# Patient Record
Sex: FEMALE | Race: WHITE | Employment: FULL TIME | URBAN - METROPOLITAN AREA
[De-identification: names, ages, dates, MRNs, and addresses within clinical notes are randomized per-mention and may not be internally consistent; named-entity substitution may affect disease eponyms.]

---

## 2017-04-12 ENCOUNTER — OFFICE VISIT (OUTPATIENT)
Dept: FAMILY MEDICINE CLINIC | Facility: CLINIC | Age: 27
End: 2017-04-12

## 2017-04-12 VITALS
DIASTOLIC BLOOD PRESSURE: 60 MMHG | BODY MASS INDEX: 20.2 KG/M2 | TEMPERATURE: 99 F | SYSTOLIC BLOOD PRESSURE: 100 MMHG | HEIGHT: 63 IN | RESPIRATION RATE: 16 BRPM | OXYGEN SATURATION: 98 % | WEIGHT: 114 LBS | HEART RATE: 80 BPM

## 2017-04-12 DIAGNOSIS — N30.00 ACUTE CYSTITIS WITHOUT HEMATURIA: Primary | ICD-10-CM

## 2017-04-12 DIAGNOSIS — R30.0 BURNING WITH URINATION: ICD-10-CM

## 2017-04-12 PROCEDURE — 81003 URINALYSIS AUTO W/O SCOPE: CPT | Performed by: FAMILY MEDICINE

## 2017-04-12 PROCEDURE — 99203 OFFICE O/P NEW LOW 30 MIN: CPT | Performed by: FAMILY MEDICINE

## 2017-04-12 RX ORDER — SULFAMETHOXAZOLE AND TRIMETHOPRIM 800; 160 MG/1; MG/1
1 TABLET ORAL 2 TIMES DAILY
Qty: 14 TABLET | Refills: 0 | Status: SHIPPED | OUTPATIENT
Start: 2017-04-12 | End: 2017-04-19

## 2017-04-12 NOTE — PROGRESS NOTES
Annabel Ross is a 27year old female. HPI:   Patient presents with symptoms of UTI. Complaining of urinary frequency, urgency, dysuria x 3 days. Denies back pain, fever, hematuria. No significant past history of UTI's.   Had one recently a few we every 4 (four) hours as needed for Nausea. Disp: 10 tablet Rfl: 0   [DISCONTINUED] Biotin 1000 MCG Oral Tab Take 1,000 mcg by mouth daily.  Disp: 30 tablet Rfl: 5   [DISCONTINUED] Fluticasone Propionate 50 MCG/ACT Nasal Suspension 2 sprays by Each Nare rout

## 2017-04-13 ENCOUNTER — TELEPHONE (OUTPATIENT)
Dept: FAMILY MEDICINE CLINIC | Facility: CLINIC | Age: 27
End: 2017-04-13

## 2017-04-14 NOTE — TELEPHONE ENCOUNTER
Spoke to patient in response to a page. States she was seen yesterday for a UTI and had 3 doses of antibiotic(Sulfa/TMP) and she feels worse. Having abdominal pain and urinary frequency. No fever, no back pain, no N/V. Ibuprofen provides no relief.  She has

## 2017-04-19 ENCOUNTER — TELEPHONE (OUTPATIENT)
Dept: FAMILY MEDICINE CLINIC | Facility: CLINIC | Age: 27
End: 2017-04-19

## 2017-04-19 DIAGNOSIS — N30.00 ACUTE CYSTITIS WITHOUT HEMATURIA: Primary | ICD-10-CM

## 2017-04-19 RX ORDER — SULFAMETHOXAZOLE AND TRIMETHOPRIM 800; 160 MG/1; MG/1
1 TABLET ORAL 2 TIMES DAILY
Qty: 14 TABLET | Refills: 0 | Status: SHIPPED | OUTPATIENT
Start: 2017-04-19 | End: 2017-04-21

## 2017-04-19 NOTE — TELEPHONE ENCOUNTER
Extended bactrim for one more week as urine cx was sensitive to bactrim. Follow up if no improvement.

## 2017-04-19 NOTE — TELEPHONE ENCOUNTER
Pt transferred to nurse. She states she was seen last week for a uti. States she was given abx, feels it helped some but now feels her sx's are returning like before. Pt had been on bactrim DS 1 tab bid x 7 days. Urine cx was sensitive to this.  When [employer]

## 2017-04-21 ENCOUNTER — TELEPHONE (OUTPATIENT)
Dept: FAMILY MEDICINE CLINIC | Facility: CLINIC | Age: 27
End: 2017-04-21

## 2017-04-21 DIAGNOSIS — N30.00 ACUTE CYSTITIS WITHOUT HEMATURIA: Primary | ICD-10-CM

## 2017-04-21 RX ORDER — SULFAMETHOXAZOLE AND TRIMETHOPRIM 800; 160 MG/1; MG/1
1 TABLET ORAL 2 TIMES DAILY
Qty: 14 TABLET | Refills: 0 | Status: SHIPPED | OUTPATIENT
Start: 2017-04-21 | End: 2017-04-21

## 2017-04-21 RX ORDER — SULFAMETHOXAZOLE AND TRIMETHOPRIM 800; 160 MG/1; MG/1
1 TABLET ORAL 2 TIMES DAILY
Qty: 14 TABLET | Refills: 0 | Status: SHIPPED | OUTPATIENT
Start: 2017-04-21 | End: 2017-04-28

## 2017-04-21 NOTE — TELEPHONE ENCOUNTER
pt looking for script but looks like it was faxed but primary pharmacy was wrong for some reason it was [facility] but she uses [facility] that however pt is going out of town needs script sent to below pharmacy so she can  after work she is erinn

## 2017-04-21 NOTE — TELEPHONE ENCOUNTER
Called to the [facility] talked with Ac Fernandez who stated that they did not fill the medication ordered because the pharmacy did not have insurance information for the pt. New script was sent to [facility].   Pt called and advised

## 2017-04-28 ENCOUNTER — HOSPITAL ENCOUNTER (OUTPATIENT)
Dept: CT IMAGING | Facility: HOSPITAL | Age: 27
Discharge: HOME | End: 2017-04-28
Attending: FAMILY MEDICINE
Payer: COMMERCIAL

## 2017-04-28 ENCOUNTER — TELEPHONE (OUTPATIENT)
Dept: FAMILY MEDICINE CLINIC | Facility: CLINIC | Age: 27
End: 2017-04-28

## 2017-04-28 ENCOUNTER — APPOINTMENT (OUTPATIENT)
Dept: LAB | Facility: HOSPITAL | Age: 27
End: 2017-04-28
Attending: FAMILY MEDICINE
Payer: COMMERCIAL

## 2017-04-28 ENCOUNTER — OFFICE VISIT (OUTPATIENT)
Dept: FAMILY MEDICINE CLINIC | Facility: CLINIC | Age: 27
End: 2017-04-28

## 2017-04-28 VITALS
WEIGHT: 114 LBS | RESPIRATION RATE: 16 BRPM | HEIGHT: 63 IN | TEMPERATURE: 98 F | BODY MASS INDEX: 20.2 KG/M2 | DIASTOLIC BLOOD PRESSURE: 60 MMHG | SYSTOLIC BLOOD PRESSURE: 100 MMHG | HEART RATE: 80 BPM

## 2017-04-28 DIAGNOSIS — K92.1 BLOOD IN STOOL: ICD-10-CM

## 2017-04-28 DIAGNOSIS — R10.32 LLQ PAIN: Primary | ICD-10-CM

## 2017-04-28 DIAGNOSIS — R19.7 ACUTE DIARRHEA: ICD-10-CM

## 2017-04-28 DIAGNOSIS — R10.32 LLQ PAIN: ICD-10-CM

## 2017-04-28 PROCEDURE — 74177 CT ABD & PELVIS W/CONTRAST: CPT

## 2017-04-28 PROCEDURE — 84702 CHORIONIC GONADOTROPIN TEST: CPT | Performed by: FAMILY MEDICINE

## 2017-04-28 PROCEDURE — 81025 URINE PREGNANCY TEST: CPT | Performed by: FAMILY MEDICINE

## 2017-04-28 PROCEDURE — 99214 OFFICE O/P EST MOD 30 MIN: CPT | Performed by: FAMILY MEDICINE

## 2017-04-28 NOTE — TELEPHONE ENCOUNTER
Pt transferred to nurse. She states she is having some concerning sx's that she is wondering if related to her being on abx long term? Reports since yesterday having LLQ pain, vomited x 2, and 3 bouts of diarrhea.  All 3 times she has noticed red blood in

## 2017-04-28 NOTE — TELEPHONE ENCOUNTER
S/w pt. Advised she should be seen. She is not able to leave work. Would need later hours. Asked about schedule tomorrow. I stated one  here and she is full. Advised urgent care for her sx's as they are there later.  She did not seem willing to go but con

## 2017-04-28 NOTE — TELEPHONE ENCOUNTER
Received call from pt requesting that the order for the pregnancy test be faxed to [phone]. Advised pt that fax would be sent. Faxed order.

## 2017-04-28 NOTE — TELEPHONE ENCOUNTER
I was paged with patient CT scan results. There was a left ovarian cyst.  Results discussed with patient over the phone. Recommended to use Tylenol or ibuprofen for pain over the weekend. Try probiotic for diarrhea monitor stools for blood.   If it persi

## 2017-04-28 NOTE — TELEPHONE ENCOUNTER
Pt transferred to nurse. She was just here for vs with Dr Fernanda Tatum and is wondering if Dr Fernanda Tatum would consider her doing a blood test to r/o pregnancy. Her urine Hcg test was negative in ofc.  She is concerned if she proceeds with the CT (STAT to r/o diverticul

## 2017-04-29 ENCOUNTER — TELEPHONE (OUTPATIENT)
Dept: FAMILY MEDICINE CLINIC | Facility: CLINIC | Age: 27
End: 2017-04-29

## 2017-04-29 NOTE — TELEPHONE ENCOUNTER
At dr caban's request, call to pt for condition update. Pt sts after she spoke with dr Lockhart"discussed going to ER with my . i probably did everything wrong.  He had some Hydrocodone 5/325 left from when he had a kidney stone, so i took half a tabl

## 2017-04-29 NOTE — TELEPHONE ENCOUNTER
Patient has paged me tonight. She has severe pain in the left lower quadrant in the same area. There is no diarrhea right now. She did not take any medication for her pain. Advised her to take ibuprofen 200 mg 2-3 tablets once now.   If the pain would n

## 2017-05-01 NOTE — PATIENT INSTRUCTIONS
Unknown Causes of Abdominal Pain (Female)    The exact cause of your abdominal (stomach) pain is not clear. This does not mean that this is something to worry about.  Everyone likes to know the exact cause of the problem, but sometimes with abdominal pain · Water is important so you do not get dehydrated. Soup may also be good. Sports drinks may also help, especially if they are not too acidic. Make sure you don't drink sugary drinks as this can make things worse. Take liquids in small amounts.  Do not guzzl © 3940-1432 [address], [address] [employer]. All rights reserved. This information is not intended as a substitute for professional medical care. Always follow your healthcare professional's instructions.

## 2017-05-01 NOTE — PROGRESS NOTES
[facility] Family Medicine Office Note  Chief Complaint:   Patient presents with:  Abdominal Pain: LLQ PAIN      HPI:   This is a 27year old female coming in for abdominal pain. [place] [facility] Pain is described as aching, sharp.  Severi REVIEW OF SYSTEMS:   ROS:  CONSTITUTIONAL:  Denies any unusual weight gain/loss, fever, chills, weakness or fatigue. CARDIOVASCULAR:  Denies chest pain, chest pressure or chest discomfort. No palpitations or edema.   Denies any dyspnea on exertion or a refer to GI  -  Could be due to hemorrhoids or viral gastroenteritis  - CT ABDOMEN+PELVIS(CONTRAST ONLY)(CPT=74177); Future    3.  Acute diarrhea  -  R/o viral GE vs. Diverticulitis  -  CT showed no diverticulitis  -  Likely viral GE but if persists, will c

## 2017-07-05 PROCEDURE — 84144 ASSAY OF PROGESTERONE: CPT | Performed by: OBSTETRICS & GYNECOLOGY

## 2017-07-07 ENCOUNTER — LAB ENCOUNTER (OUTPATIENT)
Dept: LAB | Age: 27
End: 2017-07-07
Attending: OBSTETRICS & GYNECOLOGY
Payer: COMMERCIAL

## 2017-07-07 DIAGNOSIS — O20.0 THREATENED ABORTION, ANTEPARTUM: ICD-10-CM

## 2017-07-07 LAB — HCG QUANTITATIVE: 107 MIU/ML (ref ?–1)

## 2017-07-07 PROCEDURE — 84702 CHORIONIC GONADOTROPIN TEST: CPT

## 2017-07-07 PROCEDURE — 36415 COLL VENOUS BLD VENIPUNCTURE: CPT

## 2017-07-10 NOTE — PROGRESS NOTES
Normal rise in HCG. Will see pt at around 7 weeks gestation for return OB visit.   Please call patient and let her know of normal results

## 2017-07-18 ENCOUNTER — HOSPITAL ENCOUNTER (EMERGENCY)
Facility: HOSPITAL | Age: 27
Discharge: HOME | End: 2017-07-19
Payer: COMMERCIAL

## 2017-07-18 ENCOUNTER — APPOINTMENT (OUTPATIENT)
Dept: MRI IMAGING | Facility: HOSPITAL | Age: 27
End: 2017-07-18
Payer: COMMERCIAL

## 2017-07-18 DIAGNOSIS — G43.009 MIGRAINE WITHOUT AURA AND WITHOUT STATUS MIGRAINOSUS, NOT INTRACTABLE: Primary | ICD-10-CM

## 2017-07-18 DIAGNOSIS — R41.0 TRANSIENT CONFUSION: ICD-10-CM

## 2017-07-18 LAB
ALBUMIN SERPL-MCNC: 3.5 G/DL (ref 3.5–4.8)
ALP LIVER SERPL-CCNC: 55 U/L (ref 37–98)
ALT SERPL-CCNC: 20 U/L (ref 14–54)
AST SERPL-CCNC: 13 U/L (ref 15–41)
BASOPHILS # BLD AUTO: 0.06 X10(3) UL (ref 0–0.1)
BASOPHILS NFR BLD AUTO: 0.8 %
BILIRUB SERPL-MCNC: 0.4 MG/DL (ref 0.1–2)
BUN BLD-MCNC: 11 MG/DL (ref 8–20)
CALCIUM BLD-MCNC: 8.2 MG/DL (ref 8.3–10.3)
CHLORIDE: 109 MMOL/L (ref 101–111)
CO2: 23 MMOL/L (ref 22–32)
CREAT BLD-MCNC: 0.64 MG/DL (ref 0.55–1.02)
EOSINOPHIL # BLD AUTO: 0.26 X10(3) UL (ref 0–0.3)
EOSINOPHIL NFR BLD AUTO: 3.3 %
ERYTHROCYTE [DISTWIDTH] IN BLOOD BY AUTOMATED COUNT: 11.9 % (ref 11.5–16)
GLUCOSE BLD-MCNC: 107 MG/DL (ref 70–99)
GLUCOSE BLD-MCNC: 123 MG/DL (ref 65–99)
HCT VFR BLD AUTO: 32.5 % (ref 34–50)
HGB BLD-MCNC: 11.5 G/DL (ref 12–16)
IMMATURE GRANULOCYTE COUNT: 0.02 X10(3) UL (ref 0–1)
IMMATURE GRANULOCYTE RATIO %: 0.3 %
LYMPHOCYTES # BLD AUTO: 2.29 X10(3) UL (ref 0.9–4)
LYMPHOCYTES NFR BLD AUTO: 29.2 %
M PROTEIN MFR SERPL ELPH: 6.8 G/DL (ref 6.1–8.3)
MCH RBC QN AUTO: 29.5 PG (ref 27–33.2)
MCHC RBC AUTO-ENTMCNC: 35.4 G/DL (ref 31–37)
MCV RBC AUTO: 83.3 FL (ref 81–100)
MONOCYTES # BLD AUTO: 0.48 X10(3) UL (ref 0.1–0.6)
MONOCYTES NFR BLD AUTO: 6.1 %
NEUTROPHIL ABS PRELIM: 4.74 X10 (3) UL (ref 1.3–6.7)
NEUTROPHILS # BLD AUTO: 4.74 X10(3) UL (ref 1.3–6.7)
NEUTROPHILS NFR BLD AUTO: 60.3 %
PLATELET # BLD AUTO: 191 10(3)UL (ref 150–450)
POCT LOT NUMBER: NORMAL
POCT URINE PREGNANCY: POSITIVE
POTASSIUM SERPL-SCNC: 3.6 MMOL/L (ref 3.6–5.1)
RBC # BLD AUTO: 3.9 X10(6)UL (ref 3.8–5.1)
RED CELL DISTRIBUTION WIDTH-SD: 36 FL (ref 35.1–46.3)
SODIUM SERPL-SCNC: 139 MMOL/L (ref 136–144)
WBC # BLD AUTO: 7.9 X10(3) UL (ref 4–13)

## 2017-07-18 PROCEDURE — 99283 EMERGENCY DEPT VISIT LOW MDM: CPT

## 2017-07-18 PROCEDURE — 82962 GLUCOSE BLOOD TEST: CPT

## 2017-07-18 PROCEDURE — 80053 COMPREHEN METABOLIC PANEL: CPT

## 2017-07-18 PROCEDURE — 81025 URINE PREGNANCY TEST: CPT

## 2017-07-18 PROCEDURE — 70551 MRI BRAIN STEM W/O DYE: CPT

## 2017-07-18 PROCEDURE — 36415 COLL VENOUS BLD VENIPUNCTURE: CPT

## 2017-07-18 PROCEDURE — 85025 COMPLETE CBC W/AUTO DIFF WBC: CPT

## 2017-07-18 RX ORDER — METOCLOPRAMIDE HYDROCHLORIDE 5 MG/ML
5 INJECTION INTRAMUSCULAR; INTRAVENOUS ONCE
Status: DISCONTINUED | OUTPATIENT
Start: 2017-07-18 | End: 2017-07-18

## 2017-07-18 RX ORDER — SODIUM CHLORIDE 9 MG/ML
1000 INJECTION, SOLUTION INTRAVENOUS ONCE
Status: DISCONTINUED | OUTPATIENT
Start: 2017-07-18 | End: 2017-07-18

## 2017-07-18 RX ORDER — ONDANSETRON 2 MG/ML
4 INJECTION INTRAMUSCULAR; INTRAVENOUS ONCE
Status: DISCONTINUED | OUTPATIENT
Start: 2017-07-18 | End: 2017-07-18

## 2017-07-18 RX ORDER — DIPHENHYDRAMINE HYDROCHLORIDE 50 MG/ML
25 INJECTION INTRAMUSCULAR; INTRAVENOUS ONCE
Status: DISCONTINUED | OUTPATIENT
Start: 2017-07-18 | End: 2017-07-18

## 2017-07-19 VITALS
BODY MASS INDEX: 19.12 KG/M2 | OXYGEN SATURATION: 98 % | WEIGHT: 112 LBS | RESPIRATION RATE: 15 BRPM | DIASTOLIC BLOOD PRESSURE: 52 MMHG | HEART RATE: 81 BPM | HEIGHT: 64 IN | TEMPERATURE: 99 F | SYSTOLIC BLOOD PRESSURE: 98 MMHG

## 2017-07-19 NOTE — ED NOTES
Pt refused all medication because she stated that she is pregnant and her pain isn't severe. Pt was assured that the medication is safe and can be given during pregnancy. Pt still refused medication. Dr. Charleen Daugherty was made aware.  Pt was advised to verbalize

## 2017-07-19 NOTE — ED PROVIDER NOTES
Patient Seen in: [facility] Emergency Department    History   Patient presents with:  Dizziness (neurologic)  Altered Mental Status (neurologic)  Pregnancy Issues (gynecologic)    Stated Complaint: dizzy-trouble with words-confusion    HPI    Patien Allergies Sister    • Schizophrenia Sister    • High Cholesterol Mother    • Gastro-Intestinal Disorder Mother      cholecystectomy   • Thyroid Disorder Mother      small goiter   • Hypertension Mother    • Cancer Maternal Grandmother      Lung   • Heart D Extremities: Warm, well perfused, without edema    No significant deformity or joint abnormality    Calves are symmetric and nontender  Good peripheral color, cap refill . Skin: Unremarkable without lesions or rash.      Neurologic: Awake alert an subsequently canceled by myself.       MRI brain      IMPRESSION:  -Limited areas of increased FLAIR signal in the subarachnoid space within a few cortical sulci, predominately on the left (eg. ax FLAIR im 12-14) -- no appreciable GRE susceptibility changes that should prompt the patient's immediate return to the emergency department. Reasonable over the counter and prescription treatment options and Physician follow up plan was discussed. The patient is discharged home in good condition.            Dispo

## 2017-07-19 NOTE — ED NOTES
Discharge instructions were reviewed and pt verbalized understanding. Pt is AxOx4, ambulatory with steady gait, and is going home with her mother. Pt states she feels better at this time.

## 2017-07-19 NOTE — ED INITIAL ASSESSMENT (HPI)
Pt states she had migraine since 1730 tonight. She states she had episodes of confusion between 1730 to 1830. She states her confusion has subsided since then. Pt reports she is 6 weeks pregnant.

## 2017-07-20 ENCOUNTER — TELEPHONE (OUTPATIENT)
Dept: FAMILY MEDICINE CLINIC | Facility: CLINIC | Age: 27
End: 2017-07-20

## 2017-07-20 ENCOUNTER — OFFICE VISIT (OUTPATIENT)
Dept: NEUROLOGY | Facility: CLINIC | Age: 27
End: 2017-07-20

## 2017-07-20 VITALS
SYSTOLIC BLOOD PRESSURE: 100 MMHG | HEIGHT: 65 IN | RESPIRATION RATE: 16 BRPM | BODY MASS INDEX: 19.49 KG/M2 | WEIGHT: 117 LBS | HEART RATE: 69 BPM | DIASTOLIC BLOOD PRESSURE: 60 MMHG | TEMPERATURE: 99 F

## 2017-07-20 DIAGNOSIS — R29.818 TRANSIENT NEUROLOGICAL SYMPTOMS: ICD-10-CM

## 2017-07-20 DIAGNOSIS — G43.109 COMPLICATED MIGRAINE: Primary | ICD-10-CM

## 2017-07-20 PROCEDURE — 99213 OFFICE O/P EST LOW 20 MIN: CPT | Performed by: PHYSICIAN ASSISTANT

## 2017-07-20 RX ORDER — ONDANSETRON 4 MG/1
4 TABLET, ORALLY DISINTEGRATING ORAL EVERY 8 HOURS PRN
Qty: 30 TABLET | Refills: 0 | Status: SHIPPED | OUTPATIENT
Start: 2017-07-20 | End: 2017-08-07

## 2017-07-20 RX ORDER — ACETAMINOPHEN AND CODEINE PHOSPHATE 300; 30 MG/1; MG/1
1 TABLET ORAL EVERY 4 HOURS PRN
Qty: 30 TABLET | Refills: 0 | Status: SHIPPED | OUTPATIENT
Start: 2017-07-20 | End: 2017-08-07

## 2017-07-20 NOTE — TELEPHONE ENCOUNTER
Pt informed of the recommendations and voiced understanding. Pt states she is at work, not worse but still the same. States she will continue to monitor and will go to ER if worsen.

## 2017-07-20 NOTE — PROGRESS NOTES
HPI:    Patient ID: Moreno Grimm is a 32year old female. HPI     Patient is a 32year old female here for follow-up from [facility] ER for migraine. She is currently  6 weeks pregnant.   Patient with hx of migraines that would typically occur every vomiting and went to the ER. Review of Systems   Constitutional: Positive for fatigue. Negative for fever. HENT: Negative for facial swelling. Eyes: Positive for photophobia and visual disturbance. Respiratory: Negative for shortness of breath. side and 2+ on the left side. Brachioradialis reflexes are 2+ on the right side and 2+ on the left side. Patellar reflexes are 2+ on the right side and 2+ on the left side.        Achilles reflexes are 2+ on the right side and 2+ on the left emily

## 2017-07-20 NOTE — PATIENT INSTRUCTIONS
Refill policies:    • Allow 2-3 business days for refills; controlled substances may take longer.   • Contact your pharmacy at least 5 days prior to running out of medication and have them send an electronic request or submit request through the [facility] have a procedure or additional testing performed. [facility]) will contact your insurance carrier to obtain pre-certification or prior authorization.     Unfortunately, JANNA has seen an increase in denial of payment even though the p

## 2017-07-20 NOTE — TELEPHONE ENCOUNTER
Still with Migraines, confusion and dizziness this morning. Last night had upper back and neck pain and weakness. Concern about the MRI result. Please advise.

## 2017-07-20 NOTE — TELEPHONE ENCOUNTER
pt went to ER for migraines also pregnant they did MRI and as she was reading said something about Meningitis pt called OB due to confusion and dizzy ness they advised to go to ER for possible stock pt went and was given meds that were ok for also being pr

## 2017-07-20 NOTE — TELEPHONE ENCOUNTER
The findings of her MRI of the brain were very nonspecific. It could be related to migraine or some form of inflammatory process but nothing could be excluded which is why it states meningitis as well.   She is having worsening symptoms and persistent symp

## 2017-08-07 PROBLEM — Z34.80 ENCOUNTER FOR SUPERVISION OF OTHER NORMAL PREGNANCY: Status: ACTIVE | Noted: 2017-08-07

## 2017-08-07 LAB — AMB EXT STREP B CULTURE: POSITIVE

## 2017-08-07 PROCEDURE — 86901 BLOOD TYPING SEROLOGIC RH(D): CPT | Performed by: OBSTETRICS & GYNECOLOGY

## 2017-08-07 PROCEDURE — 86762 RUBELLA ANTIBODY: CPT | Performed by: OBSTETRICS & GYNECOLOGY

## 2017-08-07 PROCEDURE — 87491 CHLMYD TRACH DNA AMP PROBE: CPT | Performed by: OBSTETRICS & GYNECOLOGY

## 2017-08-07 PROCEDURE — 86900 BLOOD TYPING SEROLOGIC ABO: CPT | Performed by: OBSTETRICS & GYNECOLOGY

## 2017-08-07 PROCEDURE — 87340 HEPATITIS B SURFACE AG IA: CPT | Performed by: OBSTETRICS & GYNECOLOGY

## 2017-08-07 PROCEDURE — 36415 COLL VENOUS BLD VENIPUNCTURE: CPT | Performed by: OBSTETRICS & GYNECOLOGY

## 2017-08-07 PROCEDURE — 87086 URINE CULTURE/COLONY COUNT: CPT | Performed by: OBSTETRICS & GYNECOLOGY

## 2017-08-07 PROCEDURE — 86850 RBC ANTIBODY SCREEN: CPT | Performed by: OBSTETRICS & GYNECOLOGY

## 2017-08-07 PROCEDURE — 87147 CULTURE TYPE IMMUNOLOGIC: CPT | Performed by: OBSTETRICS & GYNECOLOGY

## 2017-08-07 PROCEDURE — 87591 N.GONORRHOEAE DNA AMP PROB: CPT | Performed by: OBSTETRICS & GYNECOLOGY

## 2017-08-07 PROCEDURE — 87389 HIV-1 AG W/HIV-1&-2 AB AG IA: CPT | Performed by: OBSTETRICS & GYNECOLOGY

## 2017-08-07 PROCEDURE — 86780 TREPONEMA PALLIDUM: CPT | Performed by: OBSTETRICS & GYNECOLOGY

## 2017-08-07 PROCEDURE — 85025 COMPLETE CBC W/AUTO DIFF WBC: CPT | Performed by: OBSTETRICS & GYNECOLOGY

## 2017-08-09 PROBLEM — B95.1: Status: ACTIVE | Noted: 2017-08-09

## 2017-08-09 PROBLEM — O23.41: Status: ACTIVE | Noted: 2017-08-09

## 2017-08-09 PROBLEM — B95.1 GROUP B STREPTOCOCCUS URINARY TRACT INFECTION AFFECTING PREGNANCY IN FIRST TRIMESTER, ANTEPARTUM: Status: ACTIVE | Noted: 2017-08-09

## 2017-08-09 PROBLEM — O23.41 GROUP B STREPTOCOCCUS URINARY TRACT INFECTION AFFECTING PREGNANCY IN FIRST TRIMESTER, ANTEPARTUM: Status: ACTIVE | Noted: 2017-08-09

## 2017-08-20 PROBLEM — O26.899 RH NEGATIVE STATE IN ANTEPARTUM PERIOD: Status: ACTIVE | Noted: 2017-08-20

## 2017-08-20 PROBLEM — Z67.91 RH NEGATIVE STATE IN ANTEPARTUM PERIOD (HCC): Status: ACTIVE | Noted: 2017-08-20

## 2017-08-20 PROBLEM — O26.899 RH NEGATIVE STATE IN ANTEPARTUM PERIOD (HCC): Status: ACTIVE | Noted: 2017-08-20

## 2017-08-20 PROBLEM — Z67.91 RH NEGATIVE STATE IN ANTEPARTUM PERIOD: Status: ACTIVE | Noted: 2017-08-20

## 2017-09-06 PROBLEM — Z83.49 FAMILY HISTORY OF CYSTIC FIBROSIS: Status: ACTIVE | Noted: 2017-09-06

## 2017-10-06 ENCOUNTER — TELEPHONE (OUTPATIENT)
Dept: FAMILY MEDICINE CLINIC | Facility: CLINIC | Age: 27
End: 2017-10-06

## 2017-10-06 NOTE — TELEPHONE ENCOUNTER
Pt called. She is 17 weeks pregnant. She has had a cough and sinus congestion for 1 week. She has been feeling light headed and feels like her heart us racing. She called her OB/GYN and they advised her to call her PCP.  Pt has no fever and does not feel sh

## 2017-11-13 ENCOUNTER — HOSPITAL ENCOUNTER (OUTPATIENT)
Dept: ULTRASOUND IMAGING | Facility: HOSPITAL | Age: 27
Discharge: HOME | End: 2017-11-13
Attending: OBSTETRICS & GYNECOLOGY
Payer: COMMERCIAL

## 2017-11-13 DIAGNOSIS — Z36.89 ENCOUNTER FOR FETAL ANATOMIC SURVEY: ICD-10-CM

## 2017-11-13 PROCEDURE — 76805 OB US >/= 14 WKS SNGL FETUS: CPT | Performed by: OBSTETRICS & GYNECOLOGY

## 2017-12-20 ENCOUNTER — LAB ENCOUNTER (OUTPATIENT)
Dept: LAB | Age: 27
End: 2017-12-20
Attending: OBSTETRICS & GYNECOLOGY
Payer: COMMERCIAL

## 2017-12-20 DIAGNOSIS — Z36.9 ENCOUNTER FOR ANTENATAL SCREENING OF MOTHER: ICD-10-CM

## 2017-12-20 PROCEDURE — 85018 HEMOGLOBIN: CPT

## 2017-12-20 PROCEDURE — 87389 HIV-1 AG W/HIV-1&-2 AB AG IA: CPT

## 2017-12-20 PROCEDURE — 86850 RBC ANTIBODY SCREEN: CPT

## 2017-12-20 PROCEDURE — 85014 HEMATOCRIT: CPT

## 2017-12-20 PROCEDURE — 36415 COLL VENOUS BLD VENIPUNCTURE: CPT

## 2017-12-20 PROCEDURE — 86780 TREPONEMA PALLIDUM: CPT

## 2017-12-22 NOTE — PROGRESS NOTES
Blood type is O neg. Pt called to remind her to schedule Rhogam inj. Mailbox full. Unable to leave msg.

## 2017-12-28 NOTE — PROGRESS NOTES
Pt notified of results and reminded on need for rhogam injection. Pt reminded of rhogam administration policy, rhogam needing to be administered 3-5 days after antibody screen. Pt verb understanding.  Will have antibody screen redrawn this week along with 1

## 2017-12-29 ENCOUNTER — LAB ENCOUNTER (OUTPATIENT)
Dept: LAB | Age: 27
End: 2017-12-29
Attending: FAMILY MEDICINE
Payer: COMMERCIAL

## 2017-12-29 DIAGNOSIS — Z36.9 ENCOUNTER FOR ANTENATAL SCREENING OF MOTHER: ICD-10-CM

## 2017-12-29 DIAGNOSIS — Z36.9 ENCOUNTER FOR ANTENATAL SCREENING: ICD-10-CM

## 2017-12-29 LAB
ANTIBODY SCREEN: NEGATIVE
GLUCOSE 1H P GLC SERPL-MCNC: 85 MG/DL

## 2017-12-29 PROCEDURE — 86850 RBC ANTIBODY SCREEN: CPT

## 2017-12-29 PROCEDURE — 36415 COLL VENOUS BLD VENIPUNCTURE: CPT

## 2017-12-29 PROCEDURE — 82950 GLUCOSE TEST: CPT

## 2018-01-22 PROBLEM — Z34.90 SUPERVISION OF NORMAL PREGNANCY: Status: ACTIVE | Noted: 2017-08-07

## 2018-01-22 PROBLEM — Z34.90 SUPERVISION OF NORMAL PREGNANCY (HCC): Status: ACTIVE | Noted: 2017-08-07

## 2018-02-06 ENCOUNTER — HOSPITAL ENCOUNTER (OUTPATIENT)
Facility: HOSPITAL | Age: 28
Setting detail: OBSERVATION
Discharge: HOME | End: 2018-02-06
Attending: OBSTETRICS & GYNECOLOGY | Admitting: OBSTETRICS & GYNECOLOGY
Payer: COMMERCIAL

## 2018-02-06 VITALS
TEMPERATURE: 98 F | BODY MASS INDEX: 24.35 KG/M2 | HEIGHT: 64 IN | SYSTOLIC BLOOD PRESSURE: 110 MMHG | HEART RATE: 74 BPM | RESPIRATION RATE: 14 BRPM | DIASTOLIC BLOOD PRESSURE: 69 MMHG | WEIGHT: 142.63 LBS

## 2018-02-06 PROBLEM — Z34.90 PREGNANCY: Status: ACTIVE | Noted: 2018-02-06

## 2018-02-06 PROBLEM — Z34.90 PREGNANCY (HCC): Status: ACTIVE | Noted: 2018-02-06

## 2018-02-06 PROCEDURE — 59025 FETAL NON-STRESS TEST: CPT

## 2018-02-06 NOTE — PROGRESS NOTES
Patient seen in office today reported some UC since last night, Cx in office 2 cm sent to L&D for evaluation. On monitor 45 minutes, irregular UC 8-10 min, palpable per patient mild. SVE 1-2/60/-2. No LOF or vaginal bleeding.   34w3d   with irregular U

## 2018-02-06 NOTE — PROGRESS NOTES
Pt is a 32year old female admitted to TRG3/TRG3-A. Patient presents with:  R/o  Labor     Pt is [id number] 34w3d intra-uterine pregnancy. History obtained, consents signed. Oriented to room, staff, and plan of care.

## 2018-03-01 ENCOUNTER — TELEPHONE (OUTPATIENT)
Dept: OBGYN UNIT | Facility: HOSPITAL | Age: 28
End: 2018-03-01

## 2018-03-02 ENCOUNTER — TELEPHONE (OUTPATIENT)
Dept: OBGYN UNIT | Facility: HOSPITAL | Age: 28
End: 2018-03-02

## 2018-03-05 ENCOUNTER — TELEPHONE (OUTPATIENT)
Dept: OBGYN UNIT | Facility: HOSPITAL | Age: 28
End: 2018-03-05

## 2018-03-06 ENCOUNTER — TELEPHONE (OUTPATIENT)
Dept: OBGYN UNIT | Facility: HOSPITAL | Age: 28
End: 2018-03-06

## 2018-03-12 ENCOUNTER — HOSPITAL ENCOUNTER (INPATIENT)
Facility: HOSPITAL | Age: 28
LOS: 2 days | Discharge: HOME | End: 2018-03-14
Attending: OBSTETRICS & GYNECOLOGY | Admitting: OBSTETRICS & GYNECOLOGY
Payer: COMMERCIAL

## 2018-03-12 ENCOUNTER — APPOINTMENT (OUTPATIENT)
Dept: OBGYN CLINIC | Facility: HOSPITAL | Age: 28
End: 2018-03-12
Payer: COMMERCIAL

## 2018-03-12 LAB
ANTIBODY SCREEN: POSITIVE
BILIRUBIN URINE: NEGATIVE
BLOOD URINE: NEGATIVE
CONTROL RUN WITHIN 24 HOURS?: YES
ERYTHROCYTE [DISTWIDTH] IN BLOOD BY AUTOMATED COUNT: 12.9 % (ref 11.5–16)
FETAL SCREEN RESULT: NEGATIVE
GLUCOSE URINE: NEGATIVE
HCT VFR BLD AUTO: 32.5 % (ref 34–50)
HGB BLD-MCNC: 10.8 G/DL (ref 12–16)
KETONE URINE: NEGATIVE
MCH RBC QN AUTO: 28.5 PG (ref 27–33.2)
MCHC RBC AUTO-ENTMCNC: 33.2 G/DL (ref 31–37)
MCV RBC AUTO: 85.8 FL (ref 81–100)
NITRITE URINE: NEGATIVE
PH URINE: 6.5 (ref 5–8)
PLATELET # BLD AUTO: 146 10(3)UL (ref 150–450)
PROTEIN URINE: 30
RBC # BLD AUTO: 3.79 X10(6)UL (ref 3.8–5.1)
RED CELL DISTRIBUTION WIDTH-SD: 39.5 FL (ref 35.1–46.3)
RH BLOOD TYPE: NEGATIVE
RH BLOOD TYPE: NEGATIVE
SPEC GRAVITY: 1.02 (ref 1–1.03)
T PALLIDUM AB SER QL IA: NONREACTIVE
URINE COLOR: YELLOW
UROBILINOGEN URINE: 0.2
WBC # BLD AUTO: 7.9 X10(3) UL (ref 4–13)

## 2018-03-12 PROCEDURE — 86870 RBC ANTIBODY IDENTIFICATION: CPT | Performed by: OBSTETRICS & GYNECOLOGY

## 2018-03-12 PROCEDURE — 3E0334Z INTRODUCTION OF SERUM, TOXOID AND VACCINE INTO PERIPHERAL VEIN, PERCUTANEOUS APPROACH: ICD-10-PCS | Performed by: OBSTETRICS & GYNECOLOGY

## 2018-03-12 PROCEDURE — 86780 TREPONEMA PALLIDUM: CPT | Performed by: OBSTETRICS & GYNECOLOGY

## 2018-03-12 PROCEDURE — 3E033VJ INTRODUCTION OF OTHER HORMONE INTO PERIPHERAL VEIN, PERCUTANEOUS APPROACH: ICD-10-PCS | Performed by: OBSTETRICS & GYNECOLOGY

## 2018-03-12 PROCEDURE — 85027 COMPLETE CBC AUTOMATED: CPT | Performed by: OBSTETRICS & GYNECOLOGY

## 2018-03-12 PROCEDURE — 86077 PHYS BLOOD BANK SERV XMATCH: CPT | Performed by: OBSTETRICS & GYNECOLOGY

## 2018-03-12 PROCEDURE — 86850 RBC ANTIBODY SCREEN: CPT | Performed by: OBSTETRICS & GYNECOLOGY

## 2018-03-12 PROCEDURE — 10907ZC DRAINAGE OF AMNIOTIC FLUID, THERAPEUTIC FROM PRODUCTS OF CONCEPTION, VIA NATURAL OR ARTIFICIAL OPENING: ICD-10-PCS | Performed by: OBSTETRICS & GYNECOLOGY

## 2018-03-12 PROCEDURE — 86901 BLOOD TYPING SEROLOGIC RH(D): CPT | Performed by: OBSTETRICS & GYNECOLOGY

## 2018-03-12 PROCEDURE — 86900 BLOOD TYPING SEROLOGIC ABO: CPT | Performed by: OBSTETRICS & GYNECOLOGY

## 2018-03-12 PROCEDURE — 85461 HEMOGLOBIN FETAL: CPT | Performed by: OBSTETRICS & GYNECOLOGY

## 2018-03-12 PROCEDURE — 81002 URINALYSIS NONAUTO W/O SCOPE: CPT

## 2018-03-12 RX ORDER — HYDROCODONE BITARTRATE AND ACETAMINOPHEN 5; 325 MG/1; MG/1
1 TABLET ORAL EVERY 4 HOURS PRN
Status: DISCONTINUED | OUTPATIENT
Start: 2018-03-12 | End: 2018-03-14

## 2018-03-12 RX ORDER — NALBUPHINE HCL 10 MG/ML
10 AMPUL (ML) INJECTION ONCE
Status: DISCONTINUED | OUTPATIENT
Start: 2018-03-12 | End: 2018-03-12

## 2018-03-12 RX ORDER — EPHEDRINE SULFATE 50 MG/ML
5 INJECTION, SOLUTION INTRAVENOUS AS NEEDED
Status: DISCONTINUED | OUTPATIENT
Start: 2018-03-12 | End: 2018-03-12

## 2018-03-12 RX ORDER — SODIUM CHLORIDE, SODIUM LACTATE, POTASSIUM CHLORIDE, CALCIUM CHLORIDE 600; 310; 30; 20 MG/100ML; MG/100ML; MG/100ML; MG/100ML
INJECTION, SOLUTION INTRAVENOUS CONTINUOUS
Status: DISCONTINUED | OUTPATIENT
Start: 2018-03-12 | End: 2018-03-12

## 2018-03-12 RX ORDER — IBUPROFEN 600 MG/1
600 TABLET ORAL EVERY 6 HOURS
Status: DISCONTINUED | OUTPATIENT
Start: 2018-03-12 | End: 2018-03-14

## 2018-03-12 RX ORDER — NALBUPHINE HCL 10 MG/ML
2.5 AMPUL (ML) INJECTION ONCE
Status: DISCONTINUED | OUTPATIENT
Start: 2018-03-12 | End: 2018-03-14

## 2018-03-12 RX ORDER — ONDANSETRON 2 MG/ML
4 INJECTION INTRAMUSCULAR; INTRAVENOUS EVERY 6 HOURS PRN
Status: DISCONTINUED | OUTPATIENT
Start: 2018-03-12 | End: 2018-03-12

## 2018-03-12 RX ORDER — BISACODYL 10 MG
10 SUPPOSITORY, RECTAL RECTAL ONCE AS NEEDED
Status: ACTIVE | OUTPATIENT
Start: 2018-03-12 | End: 2018-03-12

## 2018-03-12 RX ORDER — ZOLPIDEM TARTRATE 5 MG/1
5 TABLET ORAL NIGHTLY PRN
Status: DISCONTINUED | OUTPATIENT
Start: 2018-03-12 | End: 2018-03-14

## 2018-03-12 RX ORDER — ACETAMINOPHEN 325 MG/1
650 TABLET ORAL EVERY 4 HOURS PRN
Status: DISCONTINUED | OUTPATIENT
Start: 2018-03-12 | End: 2018-03-14

## 2018-03-12 RX ORDER — TERBUTALINE SULFATE 1 MG/ML
0.25 INJECTION, SOLUTION SUBCUTANEOUS AS NEEDED
Status: DISCONTINUED | OUTPATIENT
Start: 2018-03-12 | End: 2018-03-12

## 2018-03-12 RX ORDER — NALBUPHINE HCL 10 MG/ML
2.5 AMPUL (ML) INJECTION
Status: DISCONTINUED | OUTPATIENT
Start: 2018-03-12 | End: 2018-03-12

## 2018-03-12 RX ORDER — DOCUSATE SODIUM 100 MG/1
100 CAPSULE, LIQUID FILLED ORAL
Status: DISCONTINUED | OUTPATIENT
Start: 2018-03-12 | End: 2018-03-14

## 2018-03-12 RX ORDER — HYDROCODONE BITARTRATE AND ACETAMINOPHEN 5; 325 MG/1; MG/1
2 TABLET ORAL EVERY 4 HOURS PRN
Status: DISCONTINUED | OUTPATIENT
Start: 2018-03-12 | End: 2018-03-14

## 2018-03-12 RX ORDER — IBUPROFEN 600 MG/1
600 TABLET ORAL ONCE AS NEEDED
Status: DISCONTINUED | OUTPATIENT
Start: 2018-03-12 | End: 2018-03-12

## 2018-03-12 RX ORDER — TRISODIUM CITRATE DIHYDRATE AND CITRIC ACID MONOHYDRATE 500; 334 MG/5ML; MG/5ML
30 SOLUTION ORAL AS NEEDED
Status: DISCONTINUED | OUTPATIENT
Start: 2018-03-12 | End: 2018-03-12

## 2018-03-12 RX ORDER — NALBUPHINE HCL 10 MG/ML
AMPUL (ML) INJECTION
Status: DISPENSED
Start: 2018-03-12 | End: 2018-03-13

## 2018-03-12 RX ORDER — DEXTROSE, SODIUM CHLORIDE, SODIUM LACTATE, POTASSIUM CHLORIDE, AND CALCIUM CHLORIDE 5; .6; .31; .03; .02 G/100ML; G/100ML; G/100ML; G/100ML; G/100ML
INJECTION, SOLUTION INTRAVENOUS AS NEEDED
Status: DISCONTINUED | OUTPATIENT
Start: 2018-03-12 | End: 2018-03-12

## 2018-03-12 RX ORDER — SIMETHICONE 80 MG
80 TABLET,CHEWABLE ORAL 3 TIMES DAILY PRN
Status: DISCONTINUED | OUTPATIENT
Start: 2018-03-12 | End: 2018-03-14

## 2018-03-12 NOTE — PROGRESS NOTES
Labor Progress Note    Comfortable with epidural. No complaints.   Temp:  [98.2 °F (36.8 °C)-98.6 °F (37 °C)] 98.2 °F (36.8 °C)  Pulse:  [55-91] 55  Resp:  [17] 17  BP: (104-117)/(66-76) 104/66  FHT:  baseline 120s, moderate variability, accels appreciated,

## 2018-03-12 NOTE — PROGRESS NOTES
, 39+2 weeks arrives per ambulation. Pt here for elective induction. Pt taken to 106 and changing at this time.

## 2018-03-12 NOTE — PROGRESS NOTES
EFMs applied, FHTs 135. Pt denies ctxs, LOF, and vaginal bleeding. +FM. Pt denies any problems with this or previous pregnancies. Pt has a hx of asthma and ADD, denies meds for either. Pt denies any other medical problems.  Admission assessment initiated at

## 2018-03-12 NOTE — PLAN OF CARE
NURSING ADMISSION NOTE    Patient admitted to [facility] via wheelchair. ID bands verified with second RN. Kisses in place. Oriented to room and unit. Safety precautions initiated.      POSTPARTUM    • Long Term Goal:Experiences normal postpartum co

## 2018-03-12 NOTE — PROGRESS NOTES
Pt up to BR with assistance from this RN. Gait steady. Pt voids without difficulty. Naz-bottle given. Dermoplast to perineum. Pt back to bed without incident.

## 2018-03-12 NOTE — L&D DELIVERY NOTE
Samina Schmid  [[id number]]     Labor Events     labor?:  No    steroids?:  None   Antibiotics received during labor?:  Yes   Antibiotics (enter # doses in comment):  ampicillin   Rupture date:  3/12/18   Rupture time:     Rupture type:  A Acrocyanotic Completely pink    Heart rate Absent <100 bpm >100 bpm    Reflex irritability No response Grimace Cry or active withdrawal    Muscle tone Limp Some flexion Active motion    Respiratory effort Absent Weak cry; hypoventilation Good, crying

## 2018-03-12 NOTE — H&P
[facility]  History & Physical    Alexandre Fitting Patient Status:  Inpatient    1990 MRN [id number]   Location [address] [facility] Attending Geovanni Malave MD   Hosp Day # 0 PCP HANNA REYNA DO     SUBJECTIVE: Maternal Grandfather    • Eye Problems Maternal Grandfather      cataract   • Heart Attack Paternal Grandfather    • Mental Disorder Sister      Social History:    Smoking status: Never Smoker    Smokeless tobacco: Never Used    Alcohol use No       Home M

## 2018-03-13 LAB
BASOPHILS # BLD AUTO: 0.03 X10(3) UL (ref 0–0.1)
BASOPHILS NFR BLD AUTO: 0.3 %
EOSINOPHIL # BLD AUTO: 0.12 X10(3) UL (ref 0–0.3)
EOSINOPHIL NFR BLD AUTO: 1.2 %
ERYTHROCYTE [DISTWIDTH] IN BLOOD BY AUTOMATED COUNT: 13 % (ref 11.5–16)
HCT VFR BLD AUTO: 31 % (ref 34–50)
HGB BLD-MCNC: 10.4 G/DL (ref 12–16)
IMMATURE GRANULOCYTE COUNT: 0.04 X10(3) UL (ref 0–1)
IMMATURE GRANULOCYTE RATIO %: 0.4 %
LYMPHOCYTES # BLD AUTO: 2 X10(3) UL (ref 0.9–4)
LYMPHOCYTES NFR BLD AUTO: 20.2 %
MCH RBC QN AUTO: 28.7 PG (ref 27–33.2)
MCHC RBC AUTO-ENTMCNC: 33.5 G/DL (ref 31–37)
MCV RBC AUTO: 85.6 FL (ref 81–100)
MONOCYTES # BLD AUTO: 0.57 X10(3) UL (ref 0.1–1)
MONOCYTES NFR BLD AUTO: 5.8 %
NEUTROPHIL ABS PRELIM: 7.14 X10 (3) UL (ref 1.3–6.7)
NEUTROPHILS # BLD AUTO: 7.14 X10(3) UL (ref 1.3–6.7)
NEUTROPHILS NFR BLD AUTO: 72.1 %
PLATELET # BLD AUTO: 132 10(3)UL (ref 150–450)
RBC # BLD AUTO: 3.62 X10(6)UL (ref 3.8–5.1)
RED CELL DISTRIBUTION WIDTH-SD: 39.9 FL (ref 35.1–46.3)
WBC # BLD AUTO: 9.9 X10(3) UL (ref 4–13)

## 2018-03-13 PROCEDURE — 85025 COMPLETE CBC W/AUTO DIFF WBC: CPT | Performed by: OBSTETRICS & GYNECOLOGY

## 2018-03-13 RX ORDER — HYDROCODONE BITARTRATE AND ACETAMINOPHEN 5; 325 MG/1; MG/1
1 TABLET ORAL EVERY 6 HOURS PRN
Qty: 20 TABLET | Refills: 1 | Status: SHIPPED | OUTPATIENT
Start: 2018-03-13 | End: 2018-09-14 | Stop reason: ALTCHOICE

## 2018-03-14 VITALS
WEIGHT: 145 LBS | OXYGEN SATURATION: 98 % | HEIGHT: 64 IN | RESPIRATION RATE: 17 BRPM | BODY MASS INDEX: 24.75 KG/M2 | TEMPERATURE: 98 F | HEART RATE: 58 BPM | SYSTOLIC BLOOD PRESSURE: 107 MMHG | DIASTOLIC BLOOD PRESSURE: 64 MMHG

## 2018-03-14 PROBLEM — O23.41 GROUP B STREPTOCOCCUS URINARY TRACT INFECTION AFFECTING PREGNANCY IN FIRST TRIMESTER, ANTEPARTUM: Status: RESOLVED | Noted: 2017-08-09 | Resolved: 2018-03-14

## 2018-03-14 PROBLEM — Z34.90 PREGNANCY: Status: RESOLVED | Noted: 2018-02-06 | Resolved: 2018-03-14

## 2018-03-14 PROBLEM — Z34.90 PREGNANCY (HCC): Status: RESOLVED | Noted: 2018-02-06 | Resolved: 2018-03-14

## 2018-03-14 PROBLEM — Z67.91 RH NEGATIVE STATE IN ANTEPARTUM PERIOD: Status: RESOLVED | Noted: 2017-08-20 | Resolved: 2018-03-14

## 2018-03-14 PROBLEM — Z34.90 SUPERVISION OF NORMAL PREGNANCY (HCC): Status: RESOLVED | Noted: 2017-08-07 | Resolved: 2018-03-14

## 2018-03-14 PROBLEM — Z67.91 RH NEGATIVE STATE IN ANTEPARTUM PERIOD (HCC): Status: RESOLVED | Noted: 2017-08-20 | Resolved: 2018-03-14

## 2018-03-14 PROBLEM — Z34.90 SUPERVISION OF NORMAL PREGNANCY: Status: RESOLVED | Noted: 2017-08-07 | Resolved: 2018-03-14

## 2018-03-14 PROBLEM — O26.899 RH NEGATIVE STATE IN ANTEPARTUM PERIOD (HCC): Status: RESOLVED | Noted: 2017-08-20 | Resolved: 2018-03-14

## 2018-03-14 PROBLEM — B95.1 GROUP B STREPTOCOCCUS URINARY TRACT INFECTION AFFECTING PREGNANCY IN FIRST TRIMESTER, ANTEPARTUM: Status: RESOLVED | Noted: 2017-08-09 | Resolved: 2018-03-14

## 2018-03-14 PROBLEM — O26.899 RH NEGATIVE STATE IN ANTEPARTUM PERIOD: Status: RESOLVED | Noted: 2017-08-20 | Resolved: 2018-03-14

## 2018-03-14 PROBLEM — O23.41: Status: RESOLVED | Noted: 2017-08-09 | Resolved: 2018-03-14

## 2018-03-14 PROBLEM — B95.1: Status: RESOLVED | Noted: 2017-08-09 | Resolved: 2018-03-14

## 2018-03-14 RX ORDER — IBUPROFEN 600 MG/1
600 TABLET ORAL EVERY 6 HOURS PRN
Qty: 30 TABLET | Refills: 1 | Status: SHIPPED | OUTPATIENT
Start: 2018-03-14 | End: 2018-05-13

## 2018-03-14 RX ORDER — HYDROCODONE BITARTRATE AND ACETAMINOPHEN 5; 325 MG/1; MG/1
1-2 TABLET ORAL EVERY 4 HOURS PRN
Qty: 15 TABLET | Refills: 0 | Status: SHIPPED | OUTPATIENT
Start: 2018-03-14 | End: 2018-04-13

## 2018-03-14 NOTE — PROGRESS NOTES
S: pt having mod to severe cramping when nursing.   Lochia light  O: VS-Temp:  [97.9 °F (36.6 °C)-98.7 °F (37.1 °C)] 97.9 °F (36.6 °C)  Pulse:  [58-59] 58  Resp:  [17] 17  BP: ()/(51-64) 107/64       Abdomen- soft ND NT, uterus firm and contracted

## 2018-03-16 ENCOUNTER — TELEPHONE (OUTPATIENT)
Dept: OBGYN UNIT | Facility: HOSPITAL | Age: 28
End: 2018-03-16

## 2018-03-19 ENCOUNTER — TELEPHONE (OUTPATIENT)
Dept: OBGYN UNIT | Facility: HOSPITAL | Age: 28
End: 2018-03-19

## 2018-03-21 NOTE — PAYOR COMM NOTE
--------------  DISCHARGE REVIEW    Payor: [facility]  Subscriber #:  [id number]  Authorization Number: [id number]    Admit date: 3/12/18  Admit time:  9283  Discharge Date: 3/14/2018 10:50 AM     Admitting Physician: Joseline Fajardo MD

## 2018-03-21 NOTE — PAYOR COMM NOTE
--------------  ADMISSION REVIEW     Payor: [employer]  Subscriber #:  [id number]  Authorization Number: [id number]    Admit date: 3/12/18  Admit time: 9087       Admitting Physician: Richard Duenas MD  Attending Physician:  Carlie att.  pro • Cancer Father      NHL, kidney, prostate   • Allergies Sister    • Schizophrenia Sister    • High Cholesterol Mother    • Gastro-Intestinal Disorder Mother      cholecystectomy   • Thyroid Disorder Mother      small goiter   • Hypertension Mother    • Spontaneous   Appearance: Intact   Disposition: held for future pathology   Apgars   Living status: Living   Apgar Scoring Key:    0 1 2    Skin color Blue or pale Acrocyanotic Completely pink    Heart rate Absent <100 bpm >100 bpm    Reflex irritability N

## 2018-04-16 PROCEDURE — 88175 CYTOPATH C/V AUTO FLUID REDO: CPT | Performed by: OBSTETRICS & GYNECOLOGY

## 2018-09-14 ENCOUNTER — OFFICE VISIT (OUTPATIENT)
Dept: FAMILY MEDICINE CLINIC | Facility: CLINIC | Age: 28
End: 2018-09-14
Payer: COMMERCIAL

## 2018-09-14 VITALS
DIASTOLIC BLOOD PRESSURE: 60 MMHG | HEIGHT: 64 IN | HEART RATE: 78 BPM | BODY MASS INDEX: 20.49 KG/M2 | WEIGHT: 120 LBS | SYSTOLIC BLOOD PRESSURE: 100 MMHG | RESPIRATION RATE: 14 BRPM | TEMPERATURE: 98 F

## 2018-09-14 DIAGNOSIS — K12.0 CANKER SORES ORAL: Primary | ICD-10-CM

## 2018-09-14 PROCEDURE — 99213 OFFICE O/P EST LOW 20 MIN: CPT | Performed by: FAMILY MEDICINE

## 2018-09-14 NOTE — PATIENT INSTRUCTIONS
Understanding Canker Sores  Canker sores are small, painful sores inside the mouth. They occur most often on the tongue, gums, or insides of the cheeks. The medical term for canker sores is aphthous ulcers. What causes a canker sore?   The exact cause of Mouth sores that seem to be canker sores can be signs of a more serious illness. If you have other signs of illness along with mouth sores, you should talk with a healthcare provider.  Canker sores can be so painful that they interfere with talking, eating, Canker sores are found on the lining of the mouth. They can be inside the cheeks or lips, on the roof of the mouth, at the base of the gums, on the tongue, or in the back of the throat.  Canker sores typically have these characteristics:  · Small, flat (not Call 911 if any of these occur:  · Trouble breathing  · Inability to swallow  · Extreme drowsiness or trouble waking up  · Fainting or loss of consciousness  · Rapid heart rate  · Seizure  · Stiff neck  When to seek medical advice  Call your healthcare pro

## 2019-01-04 ENCOUNTER — OFFICE VISIT (OUTPATIENT)
Dept: FAMILY MEDICINE CLINIC | Facility: CLINIC | Age: 29
End: 2019-01-04
Payer: COMMERCIAL

## 2019-01-04 VITALS
WEIGHT: 118 LBS | RESPIRATION RATE: 14 BRPM | TEMPERATURE: 98 F | BODY MASS INDEX: 20.14 KG/M2 | HEART RATE: 84 BPM | DIASTOLIC BLOOD PRESSURE: 60 MMHG | OXYGEN SATURATION: 97 % | HEIGHT: 64 IN | SYSTOLIC BLOOD PRESSURE: 100 MMHG

## 2019-01-04 DIAGNOSIS — J22 ACUTE LOWER RESPIRATORY INFECTION: Primary | ICD-10-CM

## 2019-01-04 PROCEDURE — 99214 OFFICE O/P EST MOD 30 MIN: CPT | Performed by: FAMILY MEDICINE

## 2019-01-04 RX ORDER — AZITHROMYCIN 250 MG/1
TABLET, FILM COATED ORAL
Qty: 6 TABLET | Refills: 0 | Status: SHIPPED | OUTPATIENT
Start: 2019-01-04 | End: 2019-03-18 | Stop reason: ALTCHOICE

## 2019-01-04 NOTE — PROGRESS NOTES
HPI:   Aga Borges is a 25year old female who presents for upper respiratory symptoms for  2  weeks.  Patient reports congestion, yellow colored nasal discharge, dry cough, cough is keeping pt up at night, OTC cold meds have not been helping, den exertion  GI: no nausea or abdominal pain  NEURO: denies headaches    EXAM:   /60 (BP Location: Left arm, Patient Position: Sitting, Cuff Size: adult)   Pulse 84   Temp 98 °F (36.7 °C) (Oral)   Resp 14   Ht 64\"   Wt 118 lb   LMP 12/04/2018 (Approxim

## 2019-03-18 ENCOUNTER — OFFICE VISIT (OUTPATIENT)
Dept: FAMILY MEDICINE CLINIC | Facility: CLINIC | Age: 29
End: 2019-03-18
Payer: COMMERCIAL

## 2019-03-18 VITALS
RESPIRATION RATE: 16 BRPM | WEIGHT: 117 LBS | SYSTOLIC BLOOD PRESSURE: 94 MMHG | HEIGHT: 64 IN | OXYGEN SATURATION: 97 % | DIASTOLIC BLOOD PRESSURE: 62 MMHG | TEMPERATURE: 99 F | BODY MASS INDEX: 19.97 KG/M2 | HEART RATE: 100 BPM

## 2019-03-18 DIAGNOSIS — J22 ACUTE LOWER RESPIRATORY INFECTION: Primary | ICD-10-CM

## 2019-03-18 PROCEDURE — 99214 OFFICE O/P EST MOD 30 MIN: CPT | Performed by: FAMILY MEDICINE

## 2019-03-18 RX ORDER — CEFDINIR 300 MG/1
300 CAPSULE ORAL 2 TIMES DAILY
Qty: 20 CAPSULE | Refills: 0 | Status: SHIPPED | OUTPATIENT
Start: 2019-03-18 | End: 2019-03-28

## 2019-03-18 NOTE — PROGRESS NOTES
HPI:   Bryan Branham is a 25year old female who presents for upper respiratory symptoms for  5  days.  Patient reports congestion, fever with Tmax to 101.5, cough with yellow colored sputum, sinus pain, OTC cold meds have not been helping, prior hi with exertion; cough  CARDIOVASCULAR: denies chest pain on exertion  GI: no nausea or abdominal pain  NEURO: denies headaches    EXAM:   BP 94/62 (BP Location: Left arm, Patient Position: Sitting, Cuff Size: adult)   Pulse 100   Temp 99.1 °F (37.3 °C) (Ora

## 2019-05-15 ENCOUNTER — LAB ENCOUNTER (OUTPATIENT)
Dept: LAB | Age: 29
End: 2019-05-15
Attending: ALLERGY & IMMUNOLOGY
Payer: COMMERCIAL

## 2019-05-15 DIAGNOSIS — Z91.018 FOOD ALLERGY: ICD-10-CM

## 2019-05-15 PROCEDURE — 83516 IMMUNOASSAY NONANTIBODY: CPT

## 2019-05-15 PROCEDURE — 86256 FLUORESCENT ANTIBODY TITER: CPT

## 2019-05-15 PROCEDURE — 82784 ASSAY IGA/IGD/IGG/IGM EACH: CPT

## 2019-08-25 ENCOUNTER — HOSPITAL ENCOUNTER (EMERGENCY)
Facility: HOSPITAL | Age: 29
Discharge: HOME | End: 2019-08-25
Attending: EMERGENCY MEDICINE
Payer: COMMERCIAL

## 2019-08-25 ENCOUNTER — APPOINTMENT (OUTPATIENT)
Dept: GENERAL RADIOLOGY | Facility: HOSPITAL | Age: 29
End: 2019-08-25
Attending: EMERGENCY MEDICINE
Payer: COMMERCIAL

## 2019-08-25 VITALS
HEART RATE: 74 BPM | RESPIRATION RATE: 16 BRPM | BODY MASS INDEX: 19.29 KG/M2 | WEIGHT: 113 LBS | OXYGEN SATURATION: 99 % | TEMPERATURE: 98 F | SYSTOLIC BLOOD PRESSURE: 99 MMHG | HEIGHT: 64 IN | DIASTOLIC BLOOD PRESSURE: 56 MMHG

## 2019-08-25 DIAGNOSIS — R07.89 CHEST WALL PAIN: Primary | ICD-10-CM

## 2019-08-25 LAB
ALBUMIN SERPL-MCNC: 3.8 G/DL (ref 3.4–5)
ALBUMIN/GLOB SERPL: 1.2 {RATIO} (ref 1–2)
ALP LIVER SERPL-CCNC: 53 U/L (ref 37–98)
ALT SERPL-CCNC: 18 U/L (ref 13–56)
ANION GAP SERPL CALC-SCNC: 5 MMOL/L (ref 0–18)
AST SERPL-CCNC: 15 U/L (ref 15–37)
ATRIAL RATE: 63 BPM
BASOPHILS # BLD AUTO: 0.05 X10(3) UL (ref 0–0.2)
BASOPHILS NFR BLD AUTO: 1.2 %
BILIRUB SERPL-MCNC: 0.9 MG/DL (ref 0.1–2)
BUN BLD-MCNC: 12 MG/DL (ref 7–18)
BUN/CREAT SERPL: 15.8 (ref 10–20)
CALCIUM BLD-MCNC: 9.1 MG/DL (ref 8.5–10.1)
CHLORIDE SERPL-SCNC: 110 MMOL/L (ref 98–112)
CO2 SERPL-SCNC: 27 MMOL/L (ref 21–32)
CREAT BLD-MCNC: 0.76 MG/DL (ref 0.55–1.02)
D-DIMER: <0.27 UG/ML FEU (ref ?–0.5)
DEPRECATED RDW RBC AUTO: 38.2 FL (ref 35.1–46.3)
EOSINOPHIL # BLD AUTO: 0.21 X10(3) UL (ref 0–0.7)
EOSINOPHIL NFR BLD AUTO: 4.9 %
ERYTHROCYTE [DISTWIDTH] IN BLOOD BY AUTOMATED COUNT: 12.4 % (ref 11–15)
GLOBULIN PLAS-MCNC: 3.2 G/DL (ref 2.8–4.4)
GLUCOSE BLD-MCNC: 82 MG/DL (ref 70–99)
HCT VFR BLD AUTO: 36.7 % (ref 35–48)
HGB BLD-MCNC: 12.9 G/DL (ref 12–16)
IMM GRANULOCYTES # BLD AUTO: 0.01 X10(3) UL (ref 0–1)
IMM GRANULOCYTES NFR BLD: 0.2 %
LYMPHOCYTES # BLD AUTO: 1.65 X10(3) UL (ref 1–4)
LYMPHOCYTES NFR BLD AUTO: 38.5 %
M PROTEIN MFR SERPL ELPH: 7 G/DL (ref 6.4–8.2)
MCH RBC QN AUTO: 29.9 PG (ref 26–34)
MCHC RBC AUTO-ENTMCNC: 35.1 G/DL (ref 31–37)
MCV RBC AUTO: 85.2 FL (ref 80–100)
MONOCYTES # BLD AUTO: 0.29 X10(3) UL (ref 0.1–1)
MONOCYTES NFR BLD AUTO: 6.8 %
NEUTROPHILS # BLD AUTO: 2.08 X10 (3) UL (ref 1.5–7.7)
NEUTROPHILS # BLD AUTO: 2.08 X10(3) UL (ref 1.5–7.7)
NEUTROPHILS NFR BLD AUTO: 48.4 %
OSMOLALITY SERPL CALC.SUM OF ELEC: 293 MOSM/KG (ref 275–295)
P AXIS: 43 DEGREES
P-R INTERVAL: 134 MS
PLATELET # BLD AUTO: 215 10(3)UL (ref 150–450)
POCT LOT NUMBER: NORMAL
POCT URINE PREGNANCY: NEGATIVE
POTASSIUM SERPL-SCNC: 3.8 MMOL/L (ref 3.5–5.1)
Q-T INTERVAL: 378 MS
QRS DURATION: 86 MS
QTC CALCULATION (BEZET): 386 MS
R AXIS: 29 DEGREES
RBC # BLD AUTO: 4.31 X10(6)UL (ref 3.8–5.3)
SODIUM SERPL-SCNC: 142 MMOL/L (ref 136–145)
T AXIS: 46 DEGREES
TROPONIN I SERPL-MCNC: <0.045 NG/ML (ref ?–0.04)
VENTRICULAR RATE: 63 BPM
WBC # BLD AUTO: 4.3 X10(3) UL (ref 4–11)

## 2019-08-25 PROCEDURE — 93005 ELECTROCARDIOGRAM TRACING: CPT

## 2019-08-25 PROCEDURE — 84484 ASSAY OF TROPONIN QUANT: CPT | Performed by: EMERGENCY MEDICINE

## 2019-08-25 PROCEDURE — 85379 FIBRIN DEGRADATION QUANT: CPT | Performed by: EMERGENCY MEDICINE

## 2019-08-25 PROCEDURE — 80053 COMPREHEN METABOLIC PANEL: CPT | Performed by: EMERGENCY MEDICINE

## 2019-08-25 PROCEDURE — 36415 COLL VENOUS BLD VENIPUNCTURE: CPT

## 2019-08-25 PROCEDURE — 99285 EMERGENCY DEPT VISIT HI MDM: CPT

## 2019-08-25 PROCEDURE — 96374 THER/PROPH/DIAG INJ IV PUSH: CPT

## 2019-08-25 PROCEDURE — 71045 X-RAY EXAM CHEST 1 VIEW: CPT | Performed by: EMERGENCY MEDICINE

## 2019-08-25 PROCEDURE — 93010 ELECTROCARDIOGRAM REPORT: CPT

## 2019-08-25 PROCEDURE — 85025 COMPLETE CBC W/AUTO DIFF WBC: CPT | Performed by: EMERGENCY MEDICINE

## 2019-08-25 RX ORDER — KETOROLAC TROMETHAMINE 30 MG/ML
30 INJECTION, SOLUTION INTRAMUSCULAR; INTRAVENOUS ONCE
Status: COMPLETED | OUTPATIENT
Start: 2019-08-25 | End: 2019-08-25

## 2019-08-25 RX ORDER — IBUPROFEN 600 MG/1
600 TABLET ORAL EVERY 8 HOURS PRN
Qty: 30 TABLET | Refills: 0 | Status: SHIPPED | OUTPATIENT
Start: 2019-08-25 | End: 2019-09-01

## 2019-08-25 NOTE — ED PROVIDER NOTES
Patient Seen in: [facility] Emergency Department    History   Patient presents with:  Chest Pain Angina (cardiovascular)    Stated Complaint: chest pain    HPI    68-year-old female comes to the hospital complaint of having difficulty with chest raheem Current:/61   Pulse 76   Temp 98.4 °F (36.9 °C) (Temporal)   Resp 24   Ht 162.6 cm (5' 4\")   Wt 51.3 kg   LMP 08/20/2019   SpO2 96%   BMI 19.40 kg/m²         Physical Exam    HEENT : NCAT, EOMI, PEERL, throat clear, neck supple, no JVD, trache result                 Please view results for these tests on the individual orders.    POCT PREGNANCY, URINE   RAINBOW DRAW BLUE   RAINBOW DRAW LAVENDER   RAINBOW DRAW LIGHT GREEN   RAINBOW DRAW GOLD   CBC W/ DIFFERENTIAL     EKG    Rate, intervals and axe total) by mouth every 8 (eight) hours as needed for Pain or Fever.   Qty: 30 tablet Refills: 0

## 2019-08-25 NOTE — ED INITIAL ASSESSMENT (HPI)
Pt here crying with chest pain to L side of chest.  Started suddenly this a.m. At home. No cardiac history. NSR on monitor. Pt states pain is coming in waves. Better with certain positions.

## 2019-10-29 ENCOUNTER — HOSPITAL ENCOUNTER (EMERGENCY)
Facility: HOSPITAL | Age: 29
Discharge: HOME | End: 2019-10-29
Attending: EMERGENCY MEDICINE
Payer: COMMERCIAL

## 2019-10-29 ENCOUNTER — TELEPHONE (OUTPATIENT)
Dept: FAMILY MEDICINE CLINIC | Facility: CLINIC | Age: 29
End: 2019-10-29

## 2019-10-29 ENCOUNTER — TELEPHONE (OUTPATIENT)
Dept: NEUROLOGY | Facility: CLINIC | Age: 29
End: 2019-10-29

## 2019-10-29 ENCOUNTER — APPOINTMENT (OUTPATIENT)
Dept: MRI IMAGING | Facility: HOSPITAL | Age: 29
End: 2019-10-29
Attending: EMERGENCY MEDICINE
Payer: COMMERCIAL

## 2019-10-29 ENCOUNTER — HOSPITAL ENCOUNTER (OUTPATIENT)
Age: 29
Discharge: ANOTHER HOSPITAL | End: 2019-10-29
Attending: FAMILY MEDICINE
Payer: COMMERCIAL

## 2019-10-29 VITALS
OXYGEN SATURATION: 100 % | HEIGHT: 64 IN | DIASTOLIC BLOOD PRESSURE: 60 MMHG | SYSTOLIC BLOOD PRESSURE: 106 MMHG | RESPIRATION RATE: 15 BRPM | HEART RATE: 81 BPM | WEIGHT: 115 LBS | BODY MASS INDEX: 19.63 KG/M2 | TEMPERATURE: 99 F

## 2019-10-29 VITALS
SYSTOLIC BLOOD PRESSURE: 117 MMHG | WEIGHT: 115 LBS | BODY MASS INDEX: 19.63 KG/M2 | HEART RATE: 70 BPM | DIASTOLIC BLOOD PRESSURE: 79 MMHG | TEMPERATURE: 98 F | HEIGHT: 64 IN | OXYGEN SATURATION: 99 % | RESPIRATION RATE: 20 BRPM

## 2019-10-29 DIAGNOSIS — G43.909 MIGRAINE WITHOUT STATUS MIGRAINOSUS, NOT INTRACTABLE, UNSPECIFIED MIGRAINE TYPE: Primary | ICD-10-CM

## 2019-10-29 DIAGNOSIS — H53.149 PHOTOPHOBIA: Primary | ICD-10-CM

## 2019-10-29 DIAGNOSIS — R51.9 HEADACHE DISORDER: ICD-10-CM

## 2019-10-29 PROCEDURE — 96374 THER/PROPH/DIAG INJ IV PUSH: CPT

## 2019-10-29 PROCEDURE — 99285 EMERGENCY DEPT VISIT HI MDM: CPT

## 2019-10-29 PROCEDURE — 80053 COMPREHEN METABOLIC PANEL: CPT | Performed by: EMERGENCY MEDICINE

## 2019-10-29 PROCEDURE — 70553 MRI BRAIN STEM W/O & W/DYE: CPT | Performed by: EMERGENCY MEDICINE

## 2019-10-29 PROCEDURE — 81025 URINE PREGNANCY TEST: CPT | Performed by: FAMILY MEDICINE

## 2019-10-29 PROCEDURE — 85025 COMPLETE CBC W/AUTO DIFF WBC: CPT | Performed by: EMERGENCY MEDICINE

## 2019-10-29 PROCEDURE — 99212 OFFICE O/P EST SF 10 MIN: CPT

## 2019-10-29 PROCEDURE — 99284 EMERGENCY DEPT VISIT MOD MDM: CPT

## 2019-10-29 PROCEDURE — 96375 TX/PRO/DX INJ NEW DRUG ADDON: CPT

## 2019-10-29 PROCEDURE — A9575 INJ GADOTERATE MEGLUMI 0.1ML: HCPCS | Performed by: EMERGENCY MEDICINE

## 2019-10-29 PROCEDURE — 99202 OFFICE O/P NEW SF 15 MIN: CPT

## 2019-10-29 RX ORDER — DEXAMETHASONE SODIUM PHOSPHATE 4 MG/ML
10 VIAL (ML) INJECTION ONCE
Status: COMPLETED | OUTPATIENT
Start: 2019-10-29 | End: 2019-10-29

## 2019-10-29 RX ORDER — DIPHENHYDRAMINE HYDROCHLORIDE 50 MG/ML
25 INJECTION INTRAMUSCULAR; INTRAVENOUS ONCE
Status: COMPLETED | OUTPATIENT
Start: 2019-10-29 | End: 2019-10-29

## 2019-10-29 RX ORDER — BUTALBITAL, ACETAMINOPHEN AND CAFFEINE 50; 325; 40 MG/1; MG/1; MG/1
1-2 TABLET ORAL EVERY 6 HOURS PRN
Qty: 10 TABLET | Refills: 0 | Status: SHIPPED | OUTPATIENT
Start: 2019-10-29 | End: 2019-11-04

## 2019-10-29 RX ORDER — KETOROLAC TROMETHAMINE 30 MG/ML
30 INJECTION, SOLUTION INTRAMUSCULAR; INTRAVENOUS ONCE
Status: COMPLETED | OUTPATIENT
Start: 2019-10-29 | End: 2019-10-29

## 2019-10-29 RX ORDER — METOCLOPRAMIDE HYDROCHLORIDE 5 MG/ML
10 INJECTION INTRAMUSCULAR; INTRAVENOUS ONCE
Status: COMPLETED | OUTPATIENT
Start: 2019-10-29 | End: 2019-10-29

## 2019-10-29 NOTE — ED INITIAL ASSESSMENT (HPI)
Headache- x 2 weeks with light sensitivity. pt  Called PCP to make an appt but unable to take her in . She states she normally max. 4 per year. She stats she usually gets an aura .   Pt states she has been having headache constantly for 2 weeks   She wa

## 2019-10-29 NOTE — TELEPHONE ENCOUNTER
Call back from pt stating she wants to apologize and asks if I can talk her through our ofc recommendations again. Advised no need to apologize, reinforced our concern re the duration and symptoms that she reports are not her usual migraine symptoms.    Ex

## 2019-10-29 NOTE — ED NOTES
Patient's mother at bedside. OK to eat per MD. States pain is ok at this time and declines further intervention at this time.

## 2019-10-29 NOTE — TELEPHONE ENCOUNTER
1. What are your symptoms? Migraines, trouble finding words, vision is very sensitive to light, tingling in hands      2. How long have you been having these symptoms? Migraines for two weeks, getting worse, other symptoms started last night      3.

## 2019-10-29 NOTE — ED NOTES
Patient reports \"the edge was taken off\" after medications. Ambulatory to bathroom with a steady gait. Updated on wait for MRI.

## 2019-10-29 NOTE — ED INITIAL ASSESSMENT (HPI)
Patient presents with headache for the past 2 weeks. She reports she has a history of migraines. Pain to back of head and neck. Sensitive to light. She had an episode earlier of word finding but is able to converse with ease at this time.  She states when a

## 2019-10-29 NOTE — ED PROVIDER NOTES
Patient Seen in: [facility]      History   Patient presents with:  Headache    Stated Complaint: migraine/vision issues x 2 weeks    HPI    26-year-old female presents with complaints of persistent headaches for the past 2 weeks Systems    Positive for stated complaint: migraine/vision issues x 2 weeks  Other systems are as noted in HPI. Constitutional and vital signs reviewed. All other systems reviewed and negative except as noted above.     Physical Exam     ED Triage Xenia 10/29/2019 10:58 AM

## 2019-10-29 NOTE — TELEPHONE ENCOUNTER
Pt reports known hx of migraines w aura and light sensitivity-\"usually 1-2 migraines/ and never last 2 wks like current symptoms.   Also nauseated, heightened light sensitivity-now to the point that I feel overstimulated, tingling in hands, vision is blurr

## 2019-11-01 ENCOUNTER — TELEPHONE (OUTPATIENT)
Dept: NEUROLOGY | Facility: CLINIC | Age: 29
End: 2019-11-01

## 2019-11-01 ENCOUNTER — TELEPHONE (OUTPATIENT)
Dept: FAMILY MEDICINE CLINIC | Facility: CLINIC | Age: 29
End: 2019-11-01

## 2019-11-01 DIAGNOSIS — IMO0002 CHRONIC MIGRAINE: Primary | ICD-10-CM

## 2019-11-01 NOTE — TELEPHONE ENCOUNTER
Call back to pt-advised of info from dr joyce's ofc and dr Cinthya Cronin (covering for dr arrington). Reinforced neuro appt 11/4/19 1040 am. Informed if we hear back from dr joyce's ofc today, we will call her back.    Pt reports no known migraine trigger

## 2019-11-01 NOTE — TELEPHONE ENCOUNTER
I probably would try Excedrin Migraine at home if that does not bring her headache down she would have to go to the emergency room to get the injectable medication to bring her headache down to follow-up with neurologist on Monday as scheduled. Thanks.

## 2019-11-01 NOTE — TELEPHONE ENCOUNTER
Received a call from Prachi-Triage RN from Dr. Juan Buckner office. Pt is having a migraine of 7/10 pain. We have not seen this patient in over 2 years.      RN advised the Triage RN that we would not be able to prescribe any medication as this pt has not been s

## 2019-11-01 NOTE — TELEPHONE ENCOUNTER
Call transferred live to triage-**also see pt's call to ofc 10/29 and urgent care and ER visits that same date for 2 wk hx of migraine w nausea and vision changes. Migraine hx is only 1-2 headaches per yr each lasting only a couple hrs.    Pt now reports \"

## 2019-11-01 NOTE — TELEPHONE ENCOUNTER
S: Pt is having an increasing migraine pain 7/10. B:  Pt has not been seen in the office for 2 years. Pt was seen by Dr. Mario Woo last week and given Fiorcet. Dx: Migraine    A: Pt's pain is 7/10 migraine worsening.   Advised the triage RN to tell the pt th

## 2019-11-01 NOTE — TELEPHONE ENCOUNTER
1. What are your symptoms? Start of Migraine has Aura. Pain 5 out of 10    2. How long have you been having these symptoms? Been going on and off for 2 weeks. 3. Have you done anything already to treat your symptoms?          ADDITIONAL INFO:   Ne

## 2019-11-01 NOTE — ED PROVIDER NOTES
Patient Seen in: [facility] Emergency Department      History   Patient presents with:  Headache (neurologic)    Stated Complaint: ha, photophobia, sent from     HPI    41-year-old female complaint of headache the patient's had a history of migrai Current:/60   Pulse 81   Temp 98.5 °F (36.9 °C) (Temporal)   Resp 15   Ht 162.6 cm (5' 4\")   Wt 52.2 kg   LMP 10/15/2019 (Approximate)   SpO2 100%   BMI 19.74 kg/m²         Physical Exam    Alert and oriented ×3 in no acute distress.   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 Otherwise unremarkable. Right maxillary sinus mucous retention cyst.  Chronic left maxillary sinusitis.      Dictated by: Sebastian Palomino MD on 10/29/2019 at 16:49     [place]

## 2019-11-04 ENCOUNTER — OFFICE VISIT (OUTPATIENT)
Dept: NEUROLOGY | Facility: CLINIC | Age: 29
End: 2019-11-04
Payer: COMMERCIAL

## 2019-11-04 VITALS
SYSTOLIC BLOOD PRESSURE: 100 MMHG | DIASTOLIC BLOOD PRESSURE: 60 MMHG | HEART RATE: 66 BPM | RESPIRATION RATE: 16 BRPM | WEIGHT: 116 LBS | BODY MASS INDEX: 19.81 KG/M2 | HEIGHT: 64 IN

## 2019-11-04 DIAGNOSIS — R90.89 ABNORMAL FINDING ON MRI OF BRAIN: ICD-10-CM

## 2019-11-04 DIAGNOSIS — I78.1 TELANGIECTASIA: ICD-10-CM

## 2019-11-04 DIAGNOSIS — G43.101 MIGRAINE WITH AURA AND WITH STATUS MIGRAINOSUS, NOT INTRACTABLE: Primary | ICD-10-CM

## 2019-11-04 PROCEDURE — 99214 OFFICE O/P EST MOD 30 MIN: CPT | Performed by: PHYSICIAN ASSISTANT

## 2019-11-04 RX ORDER — TOPIRAMATE 50 MG/1
TABLET, FILM COATED ORAL
Qty: 30 TABLET | Refills: 3 | Status: SHIPPED | OUTPATIENT
Start: 2019-11-04 | End: 2020-03-19

## 2019-11-04 RX ORDER — SUMATRIPTAN 100 MG/1
TABLET, FILM COATED ORAL
Qty: 9 TABLET | Refills: 3 | Status: SHIPPED | OUTPATIENT
Start: 2019-11-04 | End: 2020-09-06

## 2019-11-04 NOTE — PROGRESS NOTES
[facility] with [address]  6/7/1990  Primary Care Provider:  [address] [place] [address],     11/4/2019  Accompanied visit: No      30year old female patient being seen for: Maile Hays Outpatient Medications:   •  SUMAtriptan Succinate (IMITREX) 100 MG Oral Tab, Take 1 tab po at onset of headache, may repeat in 2 hours if headache recurs, max 2 tabs in 24 hours, Disp: 9 tablet, Rfl: 3  •  topiramate (TOPAMAX) 50 MG Oral Tab, Take 1/2 tab AM            Patient was seen and examined by Heriberto Leroy PA-C. Plan was discussed with Dr. Rob Mera.  Plan was discussed with patient and she expressed full understanding

## 2019-11-14 ENCOUNTER — OFFICE VISIT (OUTPATIENT)
Dept: SURGERY | Facility: CLINIC | Age: 29
End: 2019-11-14
Payer: COMMERCIAL

## 2019-11-14 VITALS
WEIGHT: 115 LBS | SYSTOLIC BLOOD PRESSURE: 108 MMHG | BODY MASS INDEX: 19.63 KG/M2 | HEIGHT: 64 IN | DIASTOLIC BLOOD PRESSURE: 72 MMHG | HEART RATE: 68 BPM

## 2019-11-14 DIAGNOSIS — G43.109 MIGRAINE WITH AURA AND WITHOUT STATUS MIGRAINOSUS, NOT INTRACTABLE: ICD-10-CM

## 2019-11-14 DIAGNOSIS — R93.89 ABNORMAL MRI: Primary | ICD-10-CM

## 2019-11-14 PROCEDURE — 99204 OFFICE O/P NEW MOD 45 MIN: CPT | Performed by: RADIOLOGY

## 2019-11-14 NOTE — H&P
[facility]  Interventional Neuroradiology Clinic Note        Neurology  [address] [facility] OSMAR REYNA,   Primary Care      Date of Service: 11/14/2019    Dear Joanne Hubbard,     We had the pleasure of seeing Graham Martinez We have been asked to evaluate the MR brain findings and determine if any further work up should be completed.     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 Otherwise unremarkable.      Right maxillary sinus mucous retention cyst.   I spent approximately 45 minutes with the patient with at least half the time spent on counseling the patient on her pathology and conservative medical management/treatment plan.

## 2019-11-14 NOTE — PROGRESS NOTES
New Pt referred by Gamaliel Lion for abnormal findings from the MRI of the brain. Pt states that she has a history of migraines. Pt states that she has been having orahs with migraines.  Pt states that since starting the toparmax migraines have been con

## 2019-12-09 ENCOUNTER — TELEPHONE (OUTPATIENT)
Dept: NEUROLOGY | Facility: CLINIC | Age: 29
End: 2019-12-09

## 2019-12-09 NOTE — TELEPHONE ENCOUNTER
S: Pt having mood irritability. B: Dx: Migraines    A:  Pt is feeling more depressed, irritable,emtional and anxious. Pt is not sure if she should stop the Topiramate. R:  Will route to MD for recommendation.

## 2019-12-10 NOTE — TELEPHONE ENCOUNTER
RN explained to the patient to reduce dose to 25mg at [facility] and if she improves to stay on that dose. If pt does not improve to contact the office. Pt verbalized understanding and did not have any further questions.

## 2019-12-13 RX ORDER — CLONAZEPAM 0.25 MG/1
0.25 TABLET, ORALLY DISINTEGRATING ORAL NIGHTLY PRN
Qty: 30 TABLET | Refills: 0 | Status: SHIPPED | OUTPATIENT
Start: 2019-12-13 | End: 2020-09-06

## 2019-12-13 NOTE — TELEPHONE ENCOUNTER
She was doing well on Topamax 50 mg but after a few months of using it and tolerating it she develops anxiety.   It was assumed that the Topamax was causing anxiety, it was reduced to 25 mg and anxiety improved however headache control became erratic and no

## 2019-12-13 NOTE — TELEPHONE ENCOUNTER
Took patient call, provider with patients. Topamax 25 mg not effective, migraines coming back. Wants to know next step or alternative. Pt has too much anxiety on 50 mg Topamax. Wants to be called today, does not want to go into the weekend with this.

## 2019-12-13 NOTE — TELEPHONE ENCOUNTER
S: patient is having headaches, she is on Topamax 25 mg. She cannot take more than 25 mg, because it makes her very anxious.     B: 30year old female patient being seen for: Migraines    A: Last week reduced the dose of topamax from 50 mg to 25 mg, due to

## 2020-03-19 RX ORDER — TOPIRAMATE 50 MG/1
TABLET, FILM COATED ORAL
Qty: 30 TABLET | Refills: 5 | Status: SHIPPED | OUTPATIENT
Start: 2020-03-19 | End: 2020-03-26

## 2020-03-19 NOTE — TELEPHONE ENCOUNTER
Medication: Topiramate 50 mg    Date of last refill: 11/04/2019 with 3 addt refills      Last office visit: 11/4/2019  Due back to clinic per last office note:  RTN in 3 months  Date next office visit scheduled:  No future appointments.     Last OV note rec

## 2020-03-24 ENCOUNTER — TELEPHONE (OUTPATIENT)
Dept: NEUROLOGY | Facility: CLINIC | Age: 30
End: 2020-03-24

## 2020-03-24 DIAGNOSIS — G43.109 MIGRAINE WITH AURA AND WITHOUT STATUS MIGRAINOSUS, NOT INTRACTABLE: ICD-10-CM

## 2020-03-24 NOTE — TELEPHONE ENCOUNTER
Virtual/Telephone Check-In    Gee Oilva verbally consents a Virtual/Telephone Check-In service on 03/26/20. Patient understands and accepts financial responsibility for any deductible, co-insurance and/or co-pays associated with this service.

## 2020-03-26 PROCEDURE — 99442 PHONE E/M BY PHYS 11-20 MIN: CPT | Performed by: PHYSICIAN ASSISTANT

## 2020-03-26 RX ORDER — TOPIRAMATE 50 MG/1
50 TABLET, FILM COATED ORAL
Qty: 30 TABLET | Refills: 0 | COMMUNITY
Start: 2020-03-26 | End: 2021-04-15

## 2020-04-02 ENCOUNTER — VIRTUAL PHONE E/M (OUTPATIENT)
Dept: FAMILY MEDICINE CLINIC | Facility: CLINIC | Age: 30
End: 2020-04-02
Payer: COMMERCIAL

## 2020-04-02 ENCOUNTER — APPOINTMENT (OUTPATIENT)
Dept: LAB | Age: 30
End: 2020-04-02
Attending: FAMILY MEDICINE
Payer: COMMERCIAL

## 2020-04-02 DIAGNOSIS — N30.00 ACUTE CYSTITIS WITHOUT HEMATURIA: ICD-10-CM

## 2020-04-02 DIAGNOSIS — N30.00 ACUTE CYSTITIS WITHOUT HEMATURIA: Primary | ICD-10-CM

## 2020-04-02 PROCEDURE — 99213 OFFICE O/P EST LOW 20 MIN: CPT | Performed by: FAMILY MEDICINE

## 2020-04-02 PROCEDURE — 87086 URINE CULTURE/COLONY COUNT: CPT

## 2020-04-02 RX ORDER — SULFAMETHOXAZOLE AND TRIMETHOPRIM 800; 160 MG/1; MG/1
1 TABLET ORAL 2 TIMES DAILY
Qty: 14 TABLET | Refills: 0 | Status: SHIPPED | OUTPATIENT
Start: 2020-04-02 | End: 2020-04-09

## 2020-04-02 NOTE — PROGRESS NOTES
Johana Kilpatrick is a 30year old female. Patient consents to telephone visit and is agreeable to any charges she may incur that are not covered by her insurance. Duration of visit: 6 minutes      HPI:   Patient presents with symptoms of UTI.  Comp PLAN:   UTI. Plan is to start Sulfamethoxazole-TMP -160 MG Oral Tab per tablet, Take 1 tablet by mouth 2 (two) times daily for 7 days. , Disp: 14 tablet, Rfl: 0  topiramate 50 MG Oral Tab, Take 1 and 1/2 tabs po qhs(total of 75mg qhs), Disp: 30 tablet

## 2020-07-22 ENCOUNTER — LAB ENCOUNTER (OUTPATIENT)
Dept: LAB | Facility: HOSPITAL | Age: 30
End: 2020-07-22
Attending: PREVENTIVE MEDICINE
Payer: COMMERCIAL

## 2020-07-22 ENCOUNTER — TELEPHONE (OUTPATIENT)
Dept: INTERNAL MEDICINE CLINIC | Facility: HOSPITAL | Age: 30
End: 2020-07-22

## 2020-07-22 DIAGNOSIS — Z20.822 SUSPECTED COVID-19 VIRUS INFECTION: Primary | ICD-10-CM

## 2020-07-22 DIAGNOSIS — Z20.822 SUSPECTED COVID-19 VIRUS INFECTION: ICD-10-CM

## 2020-07-22 LAB — SARS-COV-2 RNA RESP QL NAA+PROBE: NOT DETECTED

## 2020-08-26 ENCOUNTER — TELEPHONE (OUTPATIENT)
Dept: NEUROLOGY | Facility: CLINIC | Age: 30
End: 2020-08-26

## 2020-08-26 NOTE — TELEPHONE ENCOUNTER
RN spoke to the patient and was informed that her migraines have returned and she would like to resume her Topiramate. Pt wanted to know should she titrate up to resume her medication.  RN informed the pt she would discuss with the provider and call her

## 2020-08-26 NOTE — TELEPHONE ENCOUNTER
Pt stopped taking topamax since her headaches went away but now would like to restart topamax again. Pt stated she should not have stopped it.      Call pt

## 2020-08-26 NOTE — TELEPHONE ENCOUNTER
Tried calling patient and did not reach her.  I was unable to leave voice-mail message ur to full mailbox

## 2020-08-28 ENCOUNTER — TELEPHONE (OUTPATIENT)
Dept: NEUROLOGY | Facility: CLINIC | Age: 30
End: 2020-08-28

## 2020-08-28 RX ORDER — TOPIRAMATE 25 MG/1
25 TABLET ORAL 2 TIMES DAILY
Qty: 60 TABLET | Refills: 5 | Status: SHIPPED | OUTPATIENT
Start: 2020-08-28 | End: 2021-02-23

## 2020-08-28 NOTE — TELEPHONE ENCOUNTER
Called to resume it at 25 mg BID or 50 mg HS    To let us know how it is working in 4 weeks    Dr Jess Kelley

## 2020-09-06 ENCOUNTER — HOSPITAL ENCOUNTER (OUTPATIENT)
Age: 30
Discharge: HOME | End: 2020-09-06
Payer: COMMERCIAL

## 2020-09-06 VITALS
BODY MASS INDEX: 19.63 KG/M2 | HEIGHT: 64 IN | OXYGEN SATURATION: 97 % | RESPIRATION RATE: 16 BRPM | TEMPERATURE: 98 F | WEIGHT: 115 LBS | SYSTOLIC BLOOD PRESSURE: 103 MMHG | DIASTOLIC BLOOD PRESSURE: 62 MMHG | HEART RATE: 64 BPM

## 2020-09-06 DIAGNOSIS — Z20.822 CLOSE EXPOSURE TO COVID-19 VIRUS: Primary | ICD-10-CM

## 2020-09-06 PROCEDURE — 99212 OFFICE O/P EST SF 10 MIN: CPT

## 2020-09-06 NOTE — ED PROVIDER NOTES
Patient Seen in: [facility]      History   Patient presents with:  Testing    Stated Complaint: covid exposure    HPI  27 yo female presents requesting COVID testing. Patient is without symptoms.   She was exposed to a relative ill-appearing. HENT:      Head: Normocephalic and atraumatic. Cardiovascular:      Rate and Rhythm: Normal rate and regular rhythm. Heart sounds: Normal heart sounds.    Pulmonary:      Effort: Pulmonary effort is normal.      Breath sounds: Normal

## 2020-09-06 NOTE — ED INITIAL ASSESSMENT (HPI)
The patient is here for a COVID test. Exposed to a family member that was positive. Denies any symptoms.

## 2020-09-06 NOTE — ED NOTES
Patient states she does not want the test completed here, as she is an [address] and will contact [employer] on Tuesday to have a rapid test completed.

## 2020-11-18 ENCOUNTER — TELEPHONE (OUTPATIENT)
Dept: INTERNAL MEDICINE CLINIC | Facility: HOSPITAL | Age: 30
End: 2020-11-18

## 2020-11-18 ENCOUNTER — LAB ENCOUNTER (OUTPATIENT)
Dept: LAB | Age: 30
End: 2020-11-18
Attending: PREVENTIVE MEDICINE

## 2020-11-18 DIAGNOSIS — Z20.822 SUSPECTED 2019 NOVEL CORONAVIRUS INFECTION: Primary | ICD-10-CM

## 2020-11-18 DIAGNOSIS — Z20.822 SUSPECTED 2019 NOVEL CORONAVIRUS INFECTION: ICD-10-CM

## 2020-11-18 NOTE — PROGRESS NOTES
COVID test was negative. The COVID hotline was contacted who will reach out to patient with further instructions.

## 2020-11-19 ENCOUNTER — TELEPHONE (OUTPATIENT)
Dept: INTERNAL MEDICINE CLINIC | Facility: HOSPITAL | Age: 30
End: 2020-11-19

## 2020-11-19 DIAGNOSIS — Z20.822 SUSPECTED COVID-19 VIRUS INFECTION: Primary | ICD-10-CM

## 2020-11-21 ENCOUNTER — LAB ENCOUNTER (OUTPATIENT)
Dept: LAB | Age: 30
End: 2020-11-21
Attending: PREVENTIVE MEDICINE

## 2020-11-21 DIAGNOSIS — Z20.822 SUSPECTED COVID-19 VIRUS INFECTION: ICD-10-CM

## 2020-11-24 NOTE — TELEPHONE ENCOUNTER
Results and RTW guidelines:    COVID RESULT GIVEN:    []POSITIVE     [x] NEGATIVE    Notes: Negative Rapid 11/18/20.   Still asymptomatic     RTW PLAN:    [] RTW 10 days with clearance from [facility]- call for appt 2 days prior to RTW date  [x] RTW immediately, continue to monitor for sx  [] RTW when sx improve- fever free for 24 hours w/o medications, Diarrhea/Vomiting for 24 hours w/o medications  [] Follow up with PCP  [] Home until further instruction from hotline with  results  INSTRUCTIONS PROVIDED:  [x]  Plan as noted above  []  Length of time to obtain results  []  Quarantine instructions  []  S/S of worsening infection/condition and importance of prompt medical re-evaluation including when to seek emergency care    Estimated RTW date:  Immediately    [x] The employee voiced understanding of above plan/instructions  [x] Manager Notified/Labor  Notified, if pertinent

## 2020-12-01 ENCOUNTER — PATIENT MESSAGE (OUTPATIENT)
Dept: FAMILY MEDICINE CLINIC | Facility: CLINIC | Age: 30
End: 2020-12-01

## 2020-12-01 DIAGNOSIS — Z00.00 ROUTINE GENERAL MEDICAL EXAMINATION AT A HEALTH CARE FACILITY: Primary | ICD-10-CM

## 2020-12-01 DIAGNOSIS — Z13.89 SCREENING FOR GENITOURINARY CONDITION: ICD-10-CM

## 2020-12-02 NOTE — TELEPHONE ENCOUNTER
From: Bryan Branham  To: Gabriela Griffin DO  Sent: 12/1/2020 3:34 PM CST  Subject: Non-Urgent Beth Gamble,   I have scheduled a much over due physical. I was wondering if I should have my normal labs drawn before my visit.

## 2020-12-22 ENCOUNTER — LAB ENCOUNTER (OUTPATIENT)
Dept: LAB | Age: 30
End: 2020-12-22
Attending: FAMILY MEDICINE
Payer: COMMERCIAL

## 2020-12-22 DIAGNOSIS — Z13.89 SCREENING FOR GENITOURINARY CONDITION: ICD-10-CM

## 2020-12-22 DIAGNOSIS — R53.83 FATIGUE, UNSPECIFIED TYPE: ICD-10-CM

## 2020-12-22 DIAGNOSIS — Z00.00 ROUTINE GENERAL MEDICAL EXAMINATION AT A HEALTH CARE FACILITY: ICD-10-CM

## 2020-12-22 PROCEDURE — 85025 COMPLETE CBC W/AUTO DIFF WBC: CPT

## 2020-12-22 PROCEDURE — 80053 COMPREHEN METABOLIC PANEL: CPT

## 2020-12-22 PROCEDURE — 84443 ASSAY THYROID STIM HORMONE: CPT

## 2020-12-22 PROCEDURE — 81001 URINALYSIS AUTO W/SCOPE: CPT

## 2020-12-22 PROCEDURE — 87086 URINE CULTURE/COLONY COUNT: CPT

## 2020-12-22 PROCEDURE — 82607 VITAMIN B-12: CPT

## 2020-12-22 PROCEDURE — 36415 COLL VENOUS BLD VENIPUNCTURE: CPT

## 2020-12-22 PROCEDURE — 80061 LIPID PANEL: CPT

## 2020-12-23 ENCOUNTER — OFFICE VISIT (OUTPATIENT)
Dept: FAMILY MEDICINE CLINIC | Facility: CLINIC | Age: 30
End: 2020-12-23
Payer: COMMERCIAL

## 2020-12-23 VITALS
DIASTOLIC BLOOD PRESSURE: 56 MMHG | SYSTOLIC BLOOD PRESSURE: 110 MMHG | BODY MASS INDEX: 19.81 KG/M2 | RESPIRATION RATE: 18 BRPM | WEIGHT: 116 LBS | HEART RATE: 72 BPM | HEIGHT: 64 IN

## 2020-12-23 DIAGNOSIS — N30.00 ACUTE CYSTITIS WITHOUT HEMATURIA: ICD-10-CM

## 2020-12-23 DIAGNOSIS — R53.83 FATIGUE, UNSPECIFIED TYPE: ICD-10-CM

## 2020-12-23 DIAGNOSIS — Z00.00 ROUTINE GENERAL MEDICAL EXAMINATION AT A HEALTH CARE FACILITY: Primary | ICD-10-CM

## 2020-12-23 PROCEDURE — 99395 PREV VISIT EST AGE 18-39: CPT | Performed by: FAMILY MEDICINE

## 2020-12-23 PROCEDURE — 3074F SYST BP LT 130 MM HG: CPT | Performed by: FAMILY MEDICINE

## 2020-12-23 PROCEDURE — 3078F DIAST BP <80 MM HG: CPT | Performed by: FAMILY MEDICINE

## 2020-12-23 PROCEDURE — 3008F BODY MASS INDEX DOCD: CPT | Performed by: FAMILY MEDICINE

## 2020-12-23 RX ORDER — LEVOCETIRIZINE DIHYDROCHLORIDE 5 MG/1
TABLET, FILM COATED ORAL
COMMUNITY
End: 2021-02-23

## 2020-12-23 RX ORDER — NITROFURANTOIN 25; 75 MG/1; MG/1
100 CAPSULE ORAL 2 TIMES DAILY
Qty: 14 CAPSULE | Refills: 0 | Status: SHIPPED | OUTPATIENT
Start: 2020-12-23 | End: 2021-02-23

## 2020-12-23 NOTE — PROGRESS NOTES
HPI:   Gee Oliva is a 26year old female who presents for a complete physical exam. Symptoms: denies discharge, itching, burning or dysuria, periods are regular. Patient complains of urinary symptoms.   Patient states she has been having increas A/G Ratio 1.4 1.0 - 2.0    FASTING Yes    LIPID PANEL   Result Value Ref Range    Cholesterol, Total 159 <200 mg/dL    HDL Cholesterol 75 (H) 40 - 59 mg/dL    Triglycerides 44 30 - 149 mg/dL    LDL Cholesterol 75 <100 mg/dL    VLDL 9 0 - 30 mg/dL    Non HD % 34.6 %    Monocyte % 5.9 %    Eosinophil % 4.2 %    Basophil % 0.8 %    Immature Granulocyte % 0.2 %       Current Outpatient Medications   Medication Sig Dispense Refill   • Levocetirizine Dihydrochloride 5 MG Oral Tab      • Nitrofurantoin Monohyd Macr Grandmother    • Heart Surgery Maternal Grandmother         stents   • Hypertension Maternal Grandfather    • Eye Problems Maternal Grandfather         cataract   • Heart Attack Paternal Grandfather    • Mental Disorder Sister       Social History:   [place] bruits  BREAST:DEFERRED TO GYNE  LUNGS: clear to auscultation; no rhonchi, rales, or wheezing  CARDIO: RRR without murmur  GI: good BS's,no masses, HSM or tenderness  :DEFERRED TO GYNE   MUSCULOSKELETAL: back is not tender,FROM of the back  EXTREMITIES:

## 2021-02-01 ENCOUNTER — TELEPHONE (OUTPATIENT)
Dept: INTERNAL MEDICINE CLINIC | Facility: HOSPITAL | Age: 31
End: 2021-02-01

## 2021-02-01 ENCOUNTER — NURSE ONLY (OUTPATIENT)
Dept: LAB | Facility: HOSPITAL | Age: 31
End: 2021-02-01
Attending: PREVENTIVE MEDICINE

## 2021-02-01 DIAGNOSIS — Z20.822 SUSPECTED 2019 NOVEL CORONAVIRUS INFECTION: Primary | ICD-10-CM

## 2021-02-01 DIAGNOSIS — Z20.822 SUSPECTED 2019 NOVEL CORONAVIRUS INFECTION: ICD-10-CM

## 2021-02-01 LAB — SARS-COV-2 AG RESP QL IA.RAPID: NOT DETECTED

## 2021-02-01 PROCEDURE — 87426 SARSCOV CORONAVIRUS AG IA: CPT

## 2021-02-02 ENCOUNTER — TELEPHONE (OUTPATIENT)
Dept: NEUROLOGY | Facility: CLINIC | Age: 31
End: 2021-02-02

## 2021-02-02 NOTE — TELEPHONE ENCOUNTER
Acute Migraine assessment:    Is this your typical migraine? Describe any change in character from past migraines.   Yes: - no aura     Location of Pain (select all that apply):  forehead  # Days pain has been present:  5 days     Describe the pa

## 2021-02-02 NOTE — TELEPHONE ENCOUNTER
Results and RTW guidelines:    COVID RESULT GIVEN:      Test type:    [x] Rapid         []       [x] NEGATIVE     Ordered  retest?  []Yes   [x] No (skip to RTW)       Date ordered:  N/A             Dated to be taken:  N/A    If Yes, PLACE ORDER NOW and instruct the following:  [] Originally Symptomatic or Now Symptoms: RTW when sx improve- fever free for 24 hours w/o medications, Diarrhea/Vomiting for 24 hours w/o medications     [] Originally  Asymptomatic and continues to be Asymptomatic: Ok to work, continue to monitor for sx  [] Positive   - Monitor sx and temperature    - Employee should quarantine at home for at least 10 days from sx onset, follow the CDC guidelines for cleaning and quarantining; see CDC. gov                  - Notify PCP of result; seek emergent care with worsening symptoms   - Employee must make a RTW appt no sooner than 2 days prior to their RTW date with [employer] 14 communication open with management about RTW and if symptoms worsen   - Give employee information on plasma donation; [email or web address]     Notes:  Employee stated that her symptoms improved except for a headache, but she has hx of chronic headache. She also said that she  Is going to see her neurologist.  Instructed employee to call the employee covid hotline  If her symptoms worsen or other symptoms arises. RTW PLAN:    [] RTW 10 days with clearance from [facility]- call for appt 2 days prior to RTW date  [x] RTW immediately, continue to monitor for sx  [x] RTW when sx improve- fever free for 24 hours w/o medications, Diarrhea/Vomiting for 24 hours w/o medications  []  ordered; continue to monitor sx and call for new/worsening sx.   Discuss RTW guidelines with manager  [] May continue to work  [x] Follow up with PCP  [] Home until further instruction from hotline with  results  INSTRUCTIONS PROVIDED:  [x]  Plan as noted above  []  Length of time to obtain results  []  Quarantine instructions  []  S/S of worsening infection/condition and importance of prompt medical re-evaluation including when to seek emergency care    Estimated RTW date:  2/2/21    [x] The employee voiced understanding of above plan/instructions  [x] Manager Notified/Labor  Notified, if pertinent

## 2021-02-02 NOTE — PROGRESS NOTES
Notified employee of her negative covid test result, see telephone note.   Manager emailed information

## 2021-02-02 NOTE — TELEPHONE ENCOUNTER
I advised the patient to increase the Topamax dose to a higher dose for a week. She cannot take steroids because it affects her mood. She has not tried sumatriptan for fear that it will make her sleepy and she is caring for a child.   I told her however t

## 2021-02-23 ENCOUNTER — TELEPHONE (OUTPATIENT)
Dept: FAMILY MEDICINE CLINIC | Facility: CLINIC | Age: 31
End: 2021-02-23

## 2021-02-23 ENCOUNTER — OFFICE VISIT (OUTPATIENT)
Dept: INTERNAL MEDICINE CLINIC | Facility: CLINIC | Age: 31
End: 2021-02-23
Payer: COMMERCIAL

## 2021-02-23 VITALS
DIASTOLIC BLOOD PRESSURE: 68 MMHG | WEIGHT: 114 LBS | BODY MASS INDEX: 19.46 KG/M2 | TEMPERATURE: 98 F | HEIGHT: 64 IN | SYSTOLIC BLOOD PRESSURE: 110 MMHG | HEART RATE: 72 BPM

## 2021-02-23 DIAGNOSIS — R07.89 LEFT-SIDED CHEST WALL PAIN: Primary | ICD-10-CM

## 2021-02-23 PROCEDURE — 3074F SYST BP LT 130 MM HG: CPT | Performed by: INTERNAL MEDICINE

## 2021-02-23 PROCEDURE — 3078F DIAST BP <80 MM HG: CPT | Performed by: INTERNAL MEDICINE

## 2021-02-23 PROCEDURE — 3008F BODY MASS INDEX DOCD: CPT | Performed by: INTERNAL MEDICINE

## 2021-02-23 PROCEDURE — 93000 ELECTROCARDIOGRAM COMPLETE: CPT | Performed by: INTERNAL MEDICINE

## 2021-02-23 PROCEDURE — 99203 OFFICE O/P NEW LOW 30 MIN: CPT | Performed by: INTERNAL MEDICINE

## 2021-02-23 RX ORDER — CYCLOBENZAPRINE HCL 10 MG
10 TABLET ORAL NIGHTLY PRN
Qty: 10 TABLET | Refills: 1 | Status: SHIPPED | OUTPATIENT
Start: 2021-02-23 | End: 2021-03-05

## 2021-02-23 NOTE — PROGRESS NOTES
[employer]  6/7/1990    Patient presents with:  Establish Care: Addi Rojo rm 6 new pt chest pain      SUBJECTIVE   Donavan Bell is a 26year old female who presents with left chest wall pain.     The patient was in her usual state of health unt or bloating. No fevers, chills, or cough. No recent acute respiratory illness. The patient does have a history of costochondritis that occurred on August 25 of 2019, which she feels resembles some of her current symptoms.      The patient notes go 2 Standard drinks or equivalent per week      Frequency: Monthly or less      Drinks per session: 1 or 2      Binge frequency: Less than monthly    Drug use: No        OBJECTIVE:   /68 (BP Location: Right arm, Patient Position: Sitting, Cuff Size: ad use of ibuprofen 400 mg tonight with dinner, and cyclobenzaprine 10 mg at bedtime, with symptom update upon awakening tomorrow.     For symptoms of shortness of breath may be anxiety induced, or with limitation to deep inspiration secondary to the pleuritic

## 2021-02-23 NOTE — TELEPHONE ENCOUNTER
1. What are your symptoms?    l side chest pain by breast area     2. How long have you been having these symptoms? Sunday     3. Have you done anything already to treat your symptoms?          ADDITIONAL INFO:

## 2021-02-23 NOTE — TELEPHONE ENCOUNTER
Called to pt to check status and advise that no recommendations had been suggested yet. Pt stated that is fine that she is in a doctor's office right now being seen for the issues.   I asked if it was a specialist, she stated that she is being seen by one

## 2021-02-23 NOTE — TELEPHONE ENCOUNTER
Can't come in today. Symptoms: constant chest pain on left side, started Sunday and not sure what she was doing when it started. Is able to produce a \"twinge\" with certain movements. The ibuprofen and heat did take the edge off.  She denies cardiac sympto

## 2021-02-23 NOTE — TELEPHONE ENCOUNTER
Pt returned call and told the PSR's that she had spoken with one of the triage nurses, but she would like to be scheduled to see Dr Ray Starr tomorrow.   Spoke with pt who stated that Sunday she had Sharp shooting pains from breast bone that takes her breath lorena

## 2021-02-24 NOTE — TELEPHONE ENCOUNTER
Given chest pain and SOB, patient should go to ER. These are serious symptoms and should not be disregarded as not being emergent.

## 2021-02-24 NOTE — TELEPHONE ENCOUNTER
I called patient to follow-up on her status/condition-->I do not see that she was seen in the ED. I also see that the PCP in her chart id listed as Dr. Anel Mathew     Reached  and LMTCB

## 2021-03-08 ENCOUNTER — LAB ENCOUNTER (OUTPATIENT)
Dept: LAB | Age: 31
End: 2021-03-08
Attending: PREVENTIVE MEDICINE

## 2021-03-08 ENCOUNTER — TELEPHONE (OUTPATIENT)
Dept: INTERNAL MEDICINE CLINIC | Facility: HOSPITAL | Age: 31
End: 2021-03-08

## 2021-03-08 DIAGNOSIS — Z20.822 CLOSE EXPOSURE TO COVID-19 VIRUS: ICD-10-CM

## 2021-03-08 DIAGNOSIS — Z20.822 CLOSE EXPOSURE TO COVID-19 VIRUS: Primary | ICD-10-CM

## 2021-03-08 LAB — SARS-COV-2 RNA RESP QL NAA+PROBE: DETECTED

## 2021-03-08 NOTE — TELEPHONE ENCOUNTER
Results and RTW guidelines:    COVID RESULT GIVEN:      Test type:    [] Rapid         [] Alinity      [] NEGATIVE      [x] Positive   - Monitor sx and temperature    - Employee should quarantine at home for at least 10 days from sx onset, follow the C

## 2021-03-08 NOTE — TELEPHONE ENCOUNTER
Department: [facility] Loss Clinic                                 [x] [facility]  []MYRTLE   [] [address] [facility]    Dept Manager/Supervisor/team or clinical lead:  Alfredito Zambrano    Position:  [] MD     [] RN     [] Respiratory Therapist     [] PCT     [] Other MA    What shift location:     When was the last shift you worked? 03.02.2021  When are you next scheduled to work? 03.08.2021 & 03.09.2021    Did you have close contact with someone on your unit while not wearing a mask? (e.g., during meal breaks):  Yes []   No [x]    If negative, order Alinity?   [x] Yes   [] No    []  Outside testing       [x] Manager notified    INSTRUCTIONS PROVIDED:    [x]Employee was instructed to call Central scheduling at [phone] to make an appointment for their testing and remain in their vehi

## 2021-04-15 RX ORDER — TOPIRAMATE 50 MG/1
TABLET, FILM COATED ORAL
Qty: 30 TABLET | Refills: 0 | Status: SHIPPED | OUTPATIENT
Start: 2021-04-15 | End: 2021-05-26

## 2021-04-15 NOTE — TELEPHONE ENCOUNTER
Medication: Topiramate 50 mg     Date of last refill: 03/06/20        Last office visit: 11/4/2019  Due back to clinic per last office note:  RTN in 3 months  Date next office visit scheduled:  No future appointments.     Last OV note recommendation: Per D

## 2021-04-19 RX ORDER — TOPIRAMATE 50 MG/1
TABLET, FILM COATED ORAL
Qty: 90 TABLET | Refills: 0 | OUTPATIENT
Start: 2021-04-19

## 2021-05-26 ENCOUNTER — OFFICE VISIT (OUTPATIENT)
Dept: NEUROLOGY | Facility: CLINIC | Age: 31
End: 2021-05-26
Payer: COMMERCIAL

## 2021-05-26 VITALS
WEIGHT: 110 LBS | DIASTOLIC BLOOD PRESSURE: 66 MMHG | RESPIRATION RATE: 16 BRPM | HEART RATE: 68 BPM | BODY MASS INDEX: 18.78 KG/M2 | HEIGHT: 64 IN | SYSTOLIC BLOOD PRESSURE: 112 MMHG

## 2021-05-26 DIAGNOSIS — G43.109 MIGRAINE WITH AURA AND WITHOUT STATUS MIGRAINOSUS, NOT INTRACTABLE: Primary | ICD-10-CM

## 2021-05-26 PROCEDURE — 3074F SYST BP LT 130 MM HG: CPT | Performed by: PHYSICIAN ASSISTANT

## 2021-05-26 PROCEDURE — 3008F BODY MASS INDEX DOCD: CPT | Performed by: PHYSICIAN ASSISTANT

## 2021-05-26 PROCEDURE — 99213 OFFICE O/P EST LOW 20 MIN: CPT | Performed by: PHYSICIAN ASSISTANT

## 2021-05-26 PROCEDURE — 3078F DIAST BP <80 MM HG: CPT | Performed by: PHYSICIAN ASSISTANT

## 2021-05-26 RX ORDER — SUMATRIPTAN 100 MG/1
TABLET, FILM COATED ORAL
Qty: 9 TABLET | Refills: 2 | Status: SHIPPED | OUTPATIENT
Start: 2021-05-26 | End: 2021-12-16

## 2021-05-26 RX ORDER — TOPIRAMATE 50 MG/1
TABLET, FILM COATED ORAL
Qty: 90 TABLET | Refills: 3 | Status: SHIPPED | OUTPATIENT
Start: 2021-05-26 | End: 2021-12-16

## 2021-05-26 NOTE — PROGRESS NOTES
[facility] with [address]  6/7/1990  Primary Care Provider:  Mike Cabrera MD    5/26/2021  Accompanied visit: No  Previous visit and existing record notes reviewed in prep few months and thinks that she overall felt better. She does admit to clenching her teeth and does not use a mouth guard. She does think chocolate is a trigger for her headaches.       Past History update/new problem(s): None    Review of Systems:  Review o visit:   Migraine with aura and without status migrainosus, not intractable  (primary encounter diagnosis)      Discussion plus Diagnostics & Treatment Orders:    Migraines- Will have her restart the topamax for headache prevention.  Encouraged her to try t

## 2021-07-08 ENCOUNTER — TELEPHONE (OUTPATIENT)
Dept: INTERNAL MEDICINE CLINIC | Facility: CLINIC | Age: 31
End: 2021-07-08

## 2021-07-08 DIAGNOSIS — R07.89 CHEST WALL PAIN: Primary | ICD-10-CM

## 2021-07-08 DIAGNOSIS — R53.82 CHRONIC FATIGUE: ICD-10-CM

## 2021-07-08 NOTE — TELEPHONE ENCOUNTER
Patient with history of recurrent left chest wall pain. Two episodes of a sharp, stabbing, and shooting pain over the last 48 hours. Today she experienced a recurrence with a prolonged period of residual left chest wall soreness.   Symptoms are inducible t

## 2021-07-09 ENCOUNTER — LAB ENCOUNTER (OUTPATIENT)
Dept: LAB | Age: 31
End: 2021-07-09
Attending: INTERNAL MEDICINE
Payer: COMMERCIAL

## 2021-07-09 DIAGNOSIS — R07.89 CHEST WALL PAIN: ICD-10-CM

## 2021-07-09 DIAGNOSIS — R53.82 CHRONIC FATIGUE: ICD-10-CM

## 2021-07-09 LAB
CRP SERPL-MCNC: 0.48 MG/DL (ref ?–0.3)
RHEUMATOID FACT SERPL-ACNC: <10 IU/ML (ref ?–15)
SED RATE-ML: 8 MM/HR
TROPONIN I SERPL-MCNC: <0.045 NG/ML (ref ?–0.04)
URATE SERPL-MCNC: 4 MG/DL

## 2021-07-09 PROCEDURE — 84550 ASSAY OF BLOOD/URIC ACID: CPT

## 2021-07-09 PROCEDURE — 36415 COLL VENOUS BLD VENIPUNCTURE: CPT

## 2021-07-09 PROCEDURE — 86200 CCP ANTIBODY: CPT

## 2021-07-09 PROCEDURE — 86431 RHEUMATOID FACTOR QUANT: CPT

## 2021-07-09 PROCEDURE — 84484 ASSAY OF TROPONIN QUANT: CPT

## 2021-07-09 PROCEDURE — 86038 ANTINUCLEAR ANTIBODIES: CPT

## 2021-07-09 PROCEDURE — 85652 RBC SED RATE AUTOMATED: CPT

## 2021-07-09 PROCEDURE — 86140 C-REACTIVE PROTEIN: CPT

## 2021-07-12 LAB — ANA SCREEN: NEGATIVE

## 2021-07-13 LAB — CCP IGG SERPL-ACNC: <0.4 U/ML (ref 0–6.9)

## 2021-07-16 ENCOUNTER — OFFICE VISIT (OUTPATIENT)
Dept: INTERNAL MEDICINE CLINIC | Facility: CLINIC | Age: 31
End: 2021-07-16
Payer: COMMERCIAL

## 2021-07-16 VITALS
RESPIRATION RATE: 16 BRPM | SYSTOLIC BLOOD PRESSURE: 98 MMHG | TEMPERATURE: 98 F | DIASTOLIC BLOOD PRESSURE: 54 MMHG | HEIGHT: 63.78 IN | BODY MASS INDEX: 18.84 KG/M2 | HEART RATE: 76 BPM | WEIGHT: 109 LBS

## 2021-07-16 DIAGNOSIS — R07.89 OTHER CHEST PAIN: Primary | ICD-10-CM

## 2021-07-16 PROCEDURE — 3074F SYST BP LT 130 MM HG: CPT | Performed by: INTERNAL MEDICINE

## 2021-07-16 PROCEDURE — 3078F DIAST BP <80 MM HG: CPT | Performed by: INTERNAL MEDICINE

## 2021-07-16 PROCEDURE — 99214 OFFICE O/P EST MOD 30 MIN: CPT | Performed by: INTERNAL MEDICINE

## 2021-07-16 PROCEDURE — 3008F BODY MASS INDEX DOCD: CPT | Performed by: INTERNAL MEDICINE

## 2021-07-16 NOTE — PROGRESS NOTES
Rodrigo Byrdr  6/7/1990    Patient presents with:  Chest Pain      SUBJECTIVE   Rodrigo Santos is a 33year old female who presents with chest pain. The patient enjoys an active lifestyle, both physically and mentally.   She regularly unde Furthermore, no associated early satiety, nausea, burping, sour or metallic taste, abdominal bloating or cramping, or altered bowel habits.     Recent laboratory evaluation revealed a CRP of 0.48, with unremarkable remainder of autoimmune and rheumatologic per week    Drug use: No        OBJECTIVE:   BP 98/54 (BP Location: Left arm, Patient Position: Sitting, Cuff Size: adult)   Pulse 76   Temp 97.9 °F (36.6 °C) (Temporal)   Resp 16   Ht 5' 3.78\" (1.62 m)   Wt 109 lb (49.4 kg)   LMP  (LMP Unknown)   Breastf Refills:  Requested Prescriptions      No prescriptions requested or ordered in this encounter       Imaging & Consults:  CARD ECHO 2D DOPPLER (CPT=93306)    No follow-ups on file. There are no Patient Instructions on file for this visit.     All questions

## 2021-07-23 ENCOUNTER — HOSPITAL ENCOUNTER (OUTPATIENT)
Dept: CV DIAGNOSTICS | Facility: HOSPITAL | Age: 31
Discharge: HOME | End: 2021-07-23
Attending: INTERNAL MEDICINE
Payer: COMMERCIAL

## 2021-07-23 DIAGNOSIS — R07.89 OTHER CHEST PAIN: ICD-10-CM

## 2021-07-23 PROCEDURE — 93306 TTE W/DOPPLER COMPLETE: CPT | Performed by: INTERNAL MEDICINE

## 2021-08-17 ENCOUNTER — OFFICE VISIT (OUTPATIENT)
Dept: INTERNAL MEDICINE CLINIC | Facility: CLINIC | Age: 31
End: 2021-08-17
Payer: COMMERCIAL

## 2021-08-17 VITALS
HEIGHT: 63.78 IN | DIASTOLIC BLOOD PRESSURE: 64 MMHG | WEIGHT: 109 LBS | HEART RATE: 74 BPM | TEMPERATURE: 98 F | BODY MASS INDEX: 18.84 KG/M2 | SYSTOLIC BLOOD PRESSURE: 92 MMHG

## 2021-08-17 DIAGNOSIS — R30.0 DYSURIA: ICD-10-CM

## 2021-08-17 DIAGNOSIS — N30.01 ACUTE CYSTITIS WITH HEMATURIA: Primary | ICD-10-CM

## 2021-08-17 LAB
APPEARANCE: CLEAR
BILIRUBIN: NEGATIVE
GLUCOSE (URINE DIPSTICK): 100 MG/DL
MULTISTIX LOT#: ABNORMAL NUMERIC
NITRITE, URINE: POSITIVE
PH, URINE: 7 (ref 4.5–8)
PROTEIN (URINE DIPSTICK): 100 MG/DL
SPECIFIC GRAVITY: 1.02 (ref 1–1.03)
UROBILINOGEN,SEMI-QN: 2 MG/DL (ref 0–1.9)

## 2021-08-17 PROCEDURE — 3008F BODY MASS INDEX DOCD: CPT | Performed by: INTERNAL MEDICINE

## 2021-08-17 PROCEDURE — 87086 URINE CULTURE/COLONY COUNT: CPT | Performed by: INTERNAL MEDICINE

## 2021-08-17 PROCEDURE — 81003 URINALYSIS AUTO W/O SCOPE: CPT | Performed by: INTERNAL MEDICINE

## 2021-08-17 PROCEDURE — 99213 OFFICE O/P EST LOW 20 MIN: CPT | Performed by: INTERNAL MEDICINE

## 2021-08-17 PROCEDURE — 3074F SYST BP LT 130 MM HG: CPT | Performed by: INTERNAL MEDICINE

## 2021-08-17 PROCEDURE — 3078F DIAST BP <80 MM HG: CPT | Performed by: INTERNAL MEDICINE

## 2021-08-17 RX ORDER — SULFAMETHOXAZOLE AND TRIMETHOPRIM 800; 160 MG/1; MG/1
1 TABLET ORAL 2 TIMES DAILY
Qty: 6 TABLET | Refills: 0 | Status: SHIPPED | OUTPATIENT
Start: 2021-08-17 | End: 2021-08-20

## 2021-08-17 NOTE — PROGRESS NOTES
Britney Hubbard  6/7/1990    Patient presents with:  UTI: AJ rm 7 UTI symptoms      SUBJECTIVE   Britney Hubbard is a 33year old female who presents with dysuria and urinary frequency.     The patient describes a 7-day history of dysuria and urinary freque REPAIR Left 2002    pin in humerus (left)   • OTHER      wisdom teeth      Social History    Tobacco Use      Smoking status: Never Smoker      Smokeless tobacco: Never Used    Vaping Use      Vaping Use: Never used    Alcohol use:  Yes      Alcohol/week: 2

## 2021-08-19 NOTE — PROGRESS NOTES
She can contact me directly with any persistent symptoms. Will determine next step at that time.  Thanks

## 2021-09-13 ENCOUNTER — MOBILE ENCOUNTER (OUTPATIENT)
Dept: INTERNAL MEDICINE CLINIC | Facility: CLINIC | Age: 31
End: 2021-09-13

## 2021-09-13 RX ORDER — AMOXICILLIN AND CLAVULANATE POTASSIUM 875; 125 MG/1; MG/1
1 TABLET, FILM COATED ORAL 2 TIMES DAILY
Qty: 20 TABLET | Refills: 0 | Status: SHIPPED | OUTPATIENT
Start: 2021-09-13 | End: 2021-09-23

## 2021-11-03 ENCOUNTER — TELEPHONE (OUTPATIENT)
Dept: NEUROLOGY | Facility: CLINIC | Age: 31
End: 2021-11-03

## 2021-11-03 NOTE — TELEPHONE ENCOUNTER
.Acute Migraine assessment:    Is this your typical migraine? Describe any change in character from past migraines.   No:     Location of Pain (select all that apply):  left side  # Days pain has been present:  2      Describe the pain:  Juanis Ponce / Wenceslao Martinez

## 2021-11-03 NOTE — TELEPHONE ENCOUNTER
Spoke with the patient and she does not completely get rid of the headache per occurrence with sumatriptan by not redosing itself. She is happy with better control and not complete control.   She also notices that in the fall she has more of these headache

## 2021-11-07 ENCOUNTER — OFFICE VISIT (OUTPATIENT)
Dept: FAMILY MEDICINE CLINIC | Facility: CLINIC | Age: 31
End: 2021-11-07
Payer: COMMERCIAL

## 2021-11-07 VITALS
DIASTOLIC BLOOD PRESSURE: 75 MMHG | OXYGEN SATURATION: 99 % | BODY MASS INDEX: 19.36 KG/M2 | RESPIRATION RATE: 20 BRPM | HEIGHT: 64 IN | WEIGHT: 113.38 LBS | SYSTOLIC BLOOD PRESSURE: 104 MMHG | HEART RATE: 79 BPM | TEMPERATURE: 98 F

## 2021-11-07 DIAGNOSIS — J45.20 MILD INTERMITTENT ASTHMA, UNSPECIFIED WHETHER COMPLICATED: Primary | ICD-10-CM

## 2021-11-07 PROCEDURE — 3078F DIAST BP <80 MM HG: CPT | Performed by: NURSE PRACTITIONER

## 2021-11-07 PROCEDURE — 3008F BODY MASS INDEX DOCD: CPT | Performed by: NURSE PRACTITIONER

## 2021-11-07 PROCEDURE — 99213 OFFICE O/P EST LOW 20 MIN: CPT | Performed by: NURSE PRACTITIONER

## 2021-11-07 PROCEDURE — 3074F SYST BP LT 130 MM HG: CPT | Performed by: NURSE PRACTITIONER

## 2021-11-07 RX ORDER — AZITHROMYCIN 250 MG/1
TABLET, FILM COATED ORAL
Qty: 6 TABLET | Refills: 0 | Status: SHIPPED | OUTPATIENT
Start: 2021-11-07 | End: 2021-11-12

## 2021-11-07 NOTE — PROGRESS NOTES
CHIEF COMPLAINT:   Patient presents with:  Cough: 4 days cough, sinus pressure, sinus headache, loss of voice comes and goes, green mucous OTC tylenol       HPI:   Alannah Mckenna is a 33year old female who presents for upper respiratory symptoms for  4 d palpitations   GI: denies N/V/C or abdominal pain      EXAM:   /75 (BP Location: Right arm, Patient Position: Sitting)   Pulse 79   Temp 97.9 °F (36.6 °C) (Temporal)   Resp 20   Ht 5' 4\" (1.626 m)   Wt 113 lb 6.4 oz (51.4 kg)   LMP 11/04/2021 (Appro

## 2021-11-07 NOTE — PATIENT INSTRUCTIONS
Use the inhaler 1-2 puffs every 4 hours as needed  Use the Zpak for 5 days  Use OTC cold and cough medications for symptom relief-Mucinex DM

## 2021-12-15 ENCOUNTER — TELEPHONE (OUTPATIENT)
Dept: NEUROLOGY | Facility: CLINIC | Age: 31
End: 2021-12-15

## 2021-12-15 NOTE — TELEPHONE ENCOUNTER
Spoke with patient who is compliant on TOPAMAX. Refilled SUMATRIPAN on 12/9/2021 and has four pills left, did take three pills on one day. Is also MOTRIN daily for daily headaches (600 mg am). Is willing to take MDP at lesser dose.   Is willing to come t

## 2021-12-15 NOTE — TELEPHONE ENCOUNTER
Patient calling, advised that she is beginning to experience migraines and headaches 5/6 days out of every week. Patient is still taking topomax and experiencing these symptoms, and is unsure as to the options she has available.  First available appoint

## 2021-12-16 ENCOUNTER — OFFICE VISIT (OUTPATIENT)
Dept: NEUROLOGY | Facility: CLINIC | Age: 31
End: 2021-12-16
Payer: COMMERCIAL

## 2021-12-16 VITALS
SYSTOLIC BLOOD PRESSURE: 99 MMHG | HEIGHT: 64 IN | RESPIRATION RATE: 16 BRPM | DIASTOLIC BLOOD PRESSURE: 76 MMHG | HEART RATE: 81 BPM | WEIGHT: 113 LBS | BODY MASS INDEX: 19.29 KG/M2

## 2021-12-16 DIAGNOSIS — I78.1 TELANGIECTASIA: ICD-10-CM

## 2021-12-16 DIAGNOSIS — R90.89 ABNORMAL FINDING ON MRI OF BRAIN: ICD-10-CM

## 2021-12-16 DIAGNOSIS — G43.109 MIGRAINE WITH AURA AND WITHOUT STATUS MIGRAINOSUS, NOT INTRACTABLE: Primary | ICD-10-CM

## 2021-12-16 PROCEDURE — 3008F BODY MASS INDEX DOCD: CPT | Performed by: OTHER

## 2021-12-16 PROCEDURE — 3078F DIAST BP <80 MM HG: CPT | Performed by: OTHER

## 2021-12-16 PROCEDURE — 99214 OFFICE O/P EST MOD 30 MIN: CPT | Performed by: OTHER

## 2021-12-16 PROCEDURE — 3074F SYST BP LT 130 MM HG: CPT | Performed by: OTHER

## 2021-12-16 RX ORDER — TOPIRAMATE 50 MG/1
TABLET, FILM COATED ORAL
Qty: 90 TABLET | Refills: 3 | Status: SHIPPED | OUTPATIENT
Start: 2021-12-16 | End: 2021-12-16

## 2021-12-16 RX ORDER — TOPIRAMATE 25 MG/1
TABLET ORAL
Qty: 90 TABLET | Refills: 5 | Status: SHIPPED | OUTPATIENT
Start: 2021-12-16 | End: 2022-01-12

## 2021-12-16 RX ORDER — METHYLPREDNISOLONE 4 MG/1
TABLET ORAL
Qty: 1 EACH | Refills: 0 | Status: SHIPPED | OUTPATIENT
Start: 2021-12-16 | End: 2022-01-12

## 2021-12-16 RX ORDER — ONDANSETRON 4 MG/1
TABLET, FILM COATED ORAL
Qty: 20 TABLET | Refills: 0 | Status: SHIPPED | OUTPATIENT
Start: 2021-12-16

## 2021-12-16 RX ORDER — SUMATRIPTAN 100 MG/1
TABLET, FILM COATED ORAL
Qty: 9 TABLET | Refills: 5 | Status: SHIPPED | OUTPATIENT
Start: 2021-12-16 | End: 2021-12-16

## 2021-12-16 RX ORDER — RIMEGEPANT SULFATE 75 MG/75MG
75 TABLET, ORALLY DISINTEGRATING ORAL AS NEEDED
Qty: 8 TABLET | Refills: 5 | Status: SHIPPED | OUTPATIENT
Start: 2021-12-16 | End: 2021-12-29

## 2021-12-16 NOTE — PATIENT INSTRUCTIONS
She is now in status migrainosus therefore we need to change medications as follows  1. Increase Topamax to 75 mg at bedtime  2. Medrol Dosepak to deal with the neurogenic inflammation  3.   Changes abortive treatment to Nurtec and do augmentation treatme

## 2021-12-16 NOTE — TELEPHONE ENCOUNTER
Pt informed RN she is having memory issues, foggy head, and continued headache. RN informed the patient we have a cancellation today, pt was transferred to the [place] to make the appt.

## 2021-12-16 NOTE — PROGRESS NOTES
[facility] with [address]  6/7/1990  Primary Care Provider:  Elvin Patel MD    12/16/2021  Accompanied visit:      (x) No.      33year old yo patient being seen for:  Migraine almonds.  Cashews, legumes,             walnuts--all nuts         EXAM:  BP 99/76 (BP Location: Left arm, Patient Position: Sitting, Cuff Size: adult)   Pulse 81   Resp 16   Ht 64\"   Wt 113 lb (51.3 kg)   LMP 12/03/2021   BMI 19.40 kg/m²   Looks stated age understands that if needed, based on condition and or test results, follow up will be readjusted      Elizabeth Lance MD  Vascular & General Neurology  Director, Multiple Sclerosis Program  [facility]  12/16/2021, Time completed 2:58 PM

## 2021-12-21 ENCOUNTER — TELEPHONE (OUTPATIENT)
Dept: NEUROLOGY | Facility: CLINIC | Age: 31
End: 2021-12-21

## 2021-12-21 NOTE — TELEPHONE ENCOUNTER
Prior authorization requested for [facility]  Clinical questions answered and submitted to insurance    Awaiting coverage determination

## 2021-12-22 NOTE — TELEPHONE ENCOUNTER
Spoke with pharmacy to confirm medication coverage as [employer] noted pt is not under plan. Per pharmacist, pt picked up Rx on 12/16/21    No further action required.

## 2021-12-29 ENCOUNTER — TELEPHONE (OUTPATIENT)
Dept: NEUROLOGY | Facility: CLINIC | Age: 31
End: 2021-12-29

## 2021-12-29 RX ORDER — FREMANEZUMAB-VFRM 225 MG/1.5ML
225 INJECTION SUBCUTANEOUS
Qty: 1.5 ML | Refills: 5 | Status: SHIPPED | OUTPATIENT
Start: 2021-12-29 | End: 2022-01-12

## 2021-12-29 RX ORDER — RIZATRIPTAN BENZOATE 10 MG/1
TABLET ORAL
Qty: 10 TABLET | Refills: 1 | Status: SHIPPED | OUTPATIENT
Start: 2021-12-29 | End: 2022-01-12

## 2021-12-29 NOTE — TELEPHONE ENCOUNTER
Complains of NURTEC (abortive) ineffective. Has taken 4 NURTEC tablets since started on 12/16. Did take at onset (still had aura and subsequent migraine), denies rebound, only took two days in a row once.      Always has HA's but not sure if always paul

## 2021-12-29 NOTE — TELEPHONE ENCOUNTER
Spoke to patient and she did see improvement in the headaches after the medrol dose pack. She feels that the topamax worked when she first started it but now is not sure if it has been helping. The nurtec has not provided her any relief.  Instructed her to

## 2021-12-29 NOTE — TELEPHONE ENCOUNTER
Patient is calling to advise that [facility]. States that she is still having consistent headaches and migraines and is wondering if there is anything we can adjust to have medication work. Please advise.

## 2022-01-05 ENCOUNTER — TELEPHONE (OUTPATIENT)
Dept: NEUROLOGY | Facility: CLINIC | Age: 32
End: 2022-01-05

## 2022-01-05 NOTE — TELEPHONE ENCOUNTER
Patient states she has no fat on abdomen, but does have fat in \"love handle\" on her side toward the back. Is wondering if provider would approve of this area for subsequent administrations. Per Dr Alfredo Avery, this area will be fine.    Educated on getting

## 2022-01-05 NOTE — TELEPHONE ENCOUNTER
Patient states she administered AJOVY for 1st time on 1/4/2021. Initial poke did not hurt, but medication burned as it entered tissue of leg. Reports immediate burning lump in leg at administration location, described as 3 inches by 2 inches.    Tried to

## 2022-01-06 ENCOUNTER — TELEPHONE (OUTPATIENT)
Dept: NEUROLOGY | Facility: CLINIC | Age: 32
End: 2022-01-06

## 2022-01-06 NOTE — TELEPHONE ENCOUNTER
PA for Noahy denied. Patient needs to have tried and failed two formulary alternative drugs. Or have a contraindication to the drugs.

## 2022-01-09 ENCOUNTER — HOSPITAL ENCOUNTER (OUTPATIENT)
Dept: MRI IMAGING | Facility: HOSPITAL | Age: 32
Discharge: HOME | End: 2022-01-09
Attending: Other
Payer: COMMERCIAL

## 2022-01-09 DIAGNOSIS — G43.109 MIGRAINE WITH AURA AND WITHOUT STATUS MIGRAINOSUS, NOT INTRACTABLE: ICD-10-CM

## 2022-01-09 DIAGNOSIS — R90.89 ABNORMAL FINDING ON MRI OF BRAIN: ICD-10-CM

## 2022-01-09 DIAGNOSIS — I78.1 TELANGIECTASIA: ICD-10-CM

## 2022-01-09 PROCEDURE — 70553 MRI BRAIN STEM W/O & W/DYE: CPT | Performed by: OTHER

## 2022-01-09 PROCEDURE — 70544 MR ANGIOGRAPHY HEAD W/O DYE: CPT | Performed by: OTHER

## 2022-01-09 PROCEDURE — A9575 INJ GADOTERATE MEGLUMI 0.1ML: HCPCS | Performed by: OTHER

## 2022-01-10 ENCOUNTER — PATIENT MESSAGE (OUTPATIENT)
Dept: NEUROLOGY | Facility: CLINIC | Age: 32
End: 2022-01-10

## 2022-01-10 NOTE — TELEPHONE ENCOUNTER
Formulary alternatives specified include Aimovig or Emgality. Will notify provider and request recommendations.

## 2022-01-10 NOTE — PROGRESS NOTES
Jaclyn    MRI and MRA of brain showed stable findings, nothing worsening of the previously noted capillary telangiectasia.  This shows no increase in size over the time frame of about 2 years and 2 months      Dr. Zane Tatum

## 2022-01-10 NOTE — TELEPHONE ENCOUNTER
From: Thai Felton  To: Carisa Guevara MD  Sent: 1/10/2022 3:21 PM CST  Subject: Continuing headaches     Great that my imaging is ok. In the result it mentions Microvascular ischemic disease. Should I be concerned about this?  I am still suffe

## 2022-01-11 ENCOUNTER — NURSE ONLY (OUTPATIENT)
Dept: NEUROLOGY | Facility: CLINIC | Age: 32
End: 2022-01-11
Payer: COMMERCIAL

## 2022-01-11 ENCOUNTER — TELEPHONE (OUTPATIENT)
Dept: NEUROLOGY | Facility: CLINIC | Age: 32
End: 2022-01-11

## 2022-01-11 DIAGNOSIS — G43.109 MIGRAINE WITH AURA AND WITHOUT STATUS MIGRAINOSUS, NOT INTRACTABLE: Primary | ICD-10-CM

## 2022-01-11 PROCEDURE — 96372 THER/PROPH/DIAG INJ SC/IM: CPT | Performed by: OTHER

## 2022-01-11 RX ORDER — METHYLPREDNISOLONE 4 MG/1
TABLET ORAL
Qty: 1 EACH | Refills: 0 | Status: SHIPPED | OUTPATIENT
Start: 2022-01-11 | End: 2022-01-14

## 2022-01-11 RX ORDER — KETOROLAC TROMETHAMINE 30 MG/ML
30 INJECTION, SOLUTION INTRAMUSCULAR; INTRAVENOUS ONCE
Status: COMPLETED | OUTPATIENT
Start: 2022-01-11 | End: 2022-01-11

## 2022-01-11 RX ADMIN — KETOROLAC TROMETHAMINE 30 MG: 30 INJECTION, SOLUTION INTRAMUSCULAR; INTRAVENOUS at 14:26:00

## 2022-01-11 RX ADMIN — Medication 10 MG: at 14:22:00

## 2022-01-11 NOTE — TELEPHONE ENCOUNTER
Patient sent a My Chart message. Per providers order, pt is coming in the office for a Tordol/Dexamethasone Injection.

## 2022-01-12 ENCOUNTER — HOSPITAL ENCOUNTER (INPATIENT)
Facility: HOSPITAL | Age: 32
LOS: 1 days | Discharge: HOME | DRG: 103 | End: 2022-01-14
Attending: EMERGENCY MEDICINE | Admitting: INTERNAL MEDICINE
Payer: COMMERCIAL

## 2022-01-12 ENCOUNTER — TELEPHONE (OUTPATIENT)
Dept: NEUROLOGY | Facility: CLINIC | Age: 32
End: 2022-01-12

## 2022-01-12 DIAGNOSIS — G43.811 OTHER MIGRAINE WITH STATUS MIGRAINOSUS, INTRACTABLE: Primary | ICD-10-CM

## 2022-01-12 LAB
ALBUMIN SERPL-MCNC: 4 G/DL (ref 3.4–5)
ALBUMIN/GLOB SERPL: 1.2 {RATIO} (ref 1–2)
ALP LIVER SERPL-CCNC: 50 U/L
ALT SERPL-CCNC: 26 U/L
ANION GAP SERPL CALC-SCNC: 6 MMOL/L (ref 0–18)
AST SERPL-CCNC: 13 U/L (ref 15–37)
BASOPHILS # BLD AUTO: 0.04 X10(3) UL (ref 0–0.2)
BASOPHILS NFR BLD AUTO: 0.5 %
BILIRUB SERPL-MCNC: 0.6 MG/DL (ref 0.1–2)
BUN BLD-MCNC: 19 MG/DL (ref 7–18)
CALCIUM BLD-MCNC: 9 MG/DL (ref 8.5–10.1)
CHLORIDE SERPL-SCNC: 111 MMOL/L (ref 98–112)
CO2 SERPL-SCNC: 23 MMOL/L (ref 21–32)
CREAT BLD-MCNC: 0.95 MG/DL
EOSINOPHIL # BLD AUTO: 0.13 X10(3) UL (ref 0–0.7)
EOSINOPHIL NFR BLD AUTO: 1.5 %
ERYTHROCYTE [DISTWIDTH] IN BLOOD BY AUTOMATED COUNT: 12.5 %
GLOBULIN PLAS-MCNC: 3.4 G/DL (ref 2.8–4.4)
GLUCOSE BLD-MCNC: 89 MG/DL (ref 70–99)
HCT VFR BLD AUTO: 36.6 %
HGB BLD-MCNC: 12.8 G/DL
IMM GRANULOCYTES # BLD AUTO: 0.03 X10(3) UL (ref 0–1)
IMM GRANULOCYTES NFR BLD: 0.3 %
LYMPHOCYTES # BLD AUTO: 3.36 X10(3) UL (ref 1–4)
LYMPHOCYTES NFR BLD AUTO: 38.7 %
MCH RBC QN AUTO: 30 PG (ref 26–34)
MCHC RBC AUTO-ENTMCNC: 35 G/DL (ref 31–37)
MCV RBC AUTO: 85.7 FL
MONOCYTES # BLD AUTO: 0.51 X10(3) UL (ref 0.1–1)
MONOCYTES NFR BLD AUTO: 5.9 %
NEUTROPHILS # BLD AUTO: 4.62 X10 (3) UL (ref 1.5–7.7)
NEUTROPHILS # BLD AUTO: 4.62 X10(3) UL (ref 1.5–7.7)
NEUTROPHILS NFR BLD AUTO: 53.1 %
OSMOLALITY SERPL CALC.SUM OF ELEC: 292 MOSM/KG (ref 275–295)
PLATELET # BLD AUTO: 239 10(3)UL (ref 150–450)
POTASSIUM SERPL-SCNC: 3.1 MMOL/L (ref 3.5–5.1)
PROT SERPL-MCNC: 7.4 G/DL (ref 6.4–8.2)
RBC # BLD AUTO: 4.27 X10(6)UL
SARS-COV-2 RNA RESP QL NAA+PROBE: NOT DETECTED
SODIUM SERPL-SCNC: 140 MMOL/L (ref 136–145)
WBC # BLD AUTO: 8.7 X10(3) UL (ref 4–11)

## 2022-01-12 PROCEDURE — 99223 1ST HOSP IP/OBS HIGH 75: CPT | Performed by: INTERNAL MEDICINE

## 2022-01-12 RX ORDER — ONDANSETRON 2 MG/ML
4 INJECTION INTRAMUSCULAR; INTRAVENOUS ONCE
Status: COMPLETED | OUTPATIENT
Start: 2022-01-12 | End: 2022-01-13

## 2022-01-12 RX ORDER — HYDROCODONE BITARTRATE AND ACETAMINOPHEN 5; 325 MG/1; MG/1
2 TABLET ORAL EVERY 4 HOURS PRN
Status: DISCONTINUED | OUTPATIENT
Start: 2022-01-12 | End: 2022-01-14

## 2022-01-12 RX ORDER — MELATONIN
3 NIGHTLY PRN
Status: DISCONTINUED | OUTPATIENT
Start: 2022-01-12 | End: 2022-01-14

## 2022-01-12 RX ORDER — DIPHENHYDRAMINE HYDROCHLORIDE 50 MG/ML
50 INJECTION INTRAMUSCULAR; INTRAVENOUS ONCE
Status: COMPLETED | OUTPATIENT
Start: 2022-01-12 | End: 2022-01-12

## 2022-01-12 RX ORDER — PROCHLORPERAZINE EDISYLATE 5 MG/ML
5 INJECTION INTRAMUSCULAR; INTRAVENOUS EVERY 8 HOURS PRN
Status: DISCONTINUED | OUTPATIENT
Start: 2022-01-12 | End: 2022-01-14

## 2022-01-12 RX ORDER — ACETAMINOPHEN 325 MG/1
650 TABLET ORAL EVERY 4 HOURS PRN
Status: DISCONTINUED | OUTPATIENT
Start: 2022-01-12 | End: 2022-01-14

## 2022-01-12 RX ORDER — DEXAMETHASONE SODIUM PHOSPHATE 4 MG/ML
4 VIAL (ML) INJECTION EVERY 6 HOURS
Status: DISCONTINUED | OUTPATIENT
Start: 2022-01-12 | End: 2022-01-13

## 2022-01-12 RX ORDER — HYDROCODONE BITARTRATE AND ACETAMINOPHEN 5; 325 MG/1; MG/1
1 TABLET ORAL EVERY 4 HOURS PRN
Status: DISCONTINUED | OUTPATIENT
Start: 2022-01-12 | End: 2022-01-14

## 2022-01-12 RX ORDER — METOCLOPRAMIDE HYDROCHLORIDE 5 MG/ML
10 INJECTION INTRAMUSCULAR; INTRAVENOUS ONCE
Status: COMPLETED | OUTPATIENT
Start: 2022-01-12 | End: 2022-01-12

## 2022-01-12 RX ORDER — TOPIRAMATE 25 MG/1
75 TABLET ORAL NIGHTLY
Status: DISCONTINUED | OUTPATIENT
Start: 2022-01-12 | End: 2022-01-14

## 2022-01-12 RX ORDER — KETOROLAC TROMETHAMINE 15 MG/ML
15 INJECTION, SOLUTION INTRAMUSCULAR; INTRAVENOUS EVERY 6 HOURS PRN
Status: DISCONTINUED | OUTPATIENT
Start: 2022-01-13 | End: 2022-01-14

## 2022-01-12 RX ORDER — DEXAMETHASONE SODIUM PHOSPHATE 4 MG/ML
4 VIAL (ML) INJECTION EVERY 8 HOURS
Status: DISCONTINUED | OUTPATIENT
Start: 2022-01-14 | End: 2022-01-13

## 2022-01-12 RX ORDER — ONDANSETRON 2 MG/ML
4 INJECTION INTRAMUSCULAR; INTRAVENOUS EVERY 6 HOURS PRN
Status: DISCONTINUED | OUTPATIENT
Start: 2022-01-12 | End: 2022-01-14

## 2022-01-12 RX ORDER — HYDROMORPHONE HYDROCHLORIDE 1 MG/ML
0.4 INJECTION, SOLUTION INTRAMUSCULAR; INTRAVENOUS; SUBCUTANEOUS EVERY 2 HOUR PRN
Status: DISCONTINUED | OUTPATIENT
Start: 2022-01-12 | End: 2022-01-14

## 2022-01-12 RX ORDER — SODIUM CHLORIDE 9 MG/ML
INJECTION, SOLUTION INTRAVENOUS CONTINUOUS
Status: ACTIVE | OUTPATIENT
Start: 2022-01-12 | End: 2022-01-13

## 2022-01-12 RX ORDER — ENOXAPARIN SODIUM 100 MG/ML
40 INJECTION SUBCUTANEOUS DAILY
Status: DISCONTINUED | OUTPATIENT
Start: 2022-01-13 | End: 2022-01-14

## 2022-01-12 RX ORDER — ERENUMAB-AOOE 70 MG/ML
70 INJECTION SUBCUTANEOUS
Qty: 1 ML | Refills: 5 | Status: SHIPPED | OUTPATIENT
Start: 2022-01-12 | End: 2022-01-14

## 2022-01-12 RX ORDER — KETOROLAC TROMETHAMINE 30 MG/ML
15 INJECTION, SOLUTION INTRAMUSCULAR; INTRAVENOUS ONCE
Status: COMPLETED | OUTPATIENT
Start: 2022-01-12 | End: 2022-01-12

## 2022-01-12 RX ORDER — ONDANSETRON 2 MG/ML
4 INJECTION INTRAMUSCULAR; INTRAVENOUS EVERY 8 HOURS
Status: DISCONTINUED | OUTPATIENT
Start: 2022-01-12 | End: 2022-01-14

## 2022-01-12 RX ORDER — HYDROMORPHONE HYDROCHLORIDE 1 MG/ML
0.2 INJECTION, SOLUTION INTRAMUSCULAR; INTRAVENOUS; SUBCUTANEOUS EVERY 2 HOUR PRN
Status: DISCONTINUED | OUTPATIENT
Start: 2022-01-12 | End: 2022-01-14

## 2022-01-12 RX ORDER — SODIUM CHLORIDE 9 MG/ML
1000 INJECTION, SOLUTION INTRAVENOUS ONCE
Status: COMPLETED | OUTPATIENT
Start: 2022-01-12 | End: 2022-01-12

## 2022-01-12 RX ORDER — DEXTROSE AND SODIUM CHLORIDE 5; .45 G/100ML; G/100ML
INJECTION, SOLUTION INTRAVENOUS CONTINUOUS
Status: DISCONTINUED | OUTPATIENT
Start: 2022-01-12 | End: 2022-01-14

## 2022-01-12 RX ORDER — HYDROMORPHONE HYDROCHLORIDE 1 MG/ML
0.8 INJECTION, SOLUTION INTRAMUSCULAR; INTRAVENOUS; SUBCUTANEOUS EVERY 2 HOUR PRN
Status: DISCONTINUED | OUTPATIENT
Start: 2022-01-12 | End: 2022-01-14

## 2022-01-12 RX ORDER — TOPIRAMATE 25 MG/1
75 TABLET ORAL NIGHTLY
COMMUNITY

## 2022-01-12 NOTE — TELEPHONE ENCOUNTER
Patient called, advised she would like to speak with a nurse about possibly doing infusions with the hospital. She has a few questions that she would like to run by RN before beginning process. Please advise.

## 2022-01-12 NOTE — TELEPHONE ENCOUNTER
Tried calling patient unable to leave voice-mail due to full mail box.  I will send in prescription for Aimovig

## 2022-01-12 NOTE — ED INITIAL ASSESSMENT (HPI)
Pt sent here for a migraine that has lasted weeks. Pt was told by her neurologist to come to ER for admission. Pt state sensitive to light, dizziness and nausea.

## 2022-01-12 NOTE — TELEPHONE ENCOUNTER
Pt reporting photophobia, phonophobia. Pt reports the tordol/dexamethaxone did not help her headache. Pt would like to be admitted for DHE. RN spoke to Dr. Bonnie Shafer who ordered she should go to the ER for DHE. Dr. Bonnie Shafer called the APN on call and RN informed Stephane the patient would be coming through the ER this evening for DHE.

## 2022-01-13 ENCOUNTER — APPOINTMENT (OUTPATIENT)
Dept: CV DIAGNOSTICS | Facility: HOSPITAL | Age: 32
DRG: 103 | End: 2022-01-13
Attending: INTERNAL MEDICINE
Payer: COMMERCIAL

## 2022-01-13 LAB
ANION GAP SERPL CALC-SCNC: 7 MMOL/L (ref 0–18)
ATRIAL RATE: 49 BPM
BASOPHILS # BLD AUTO: 0.01 X10(3) UL (ref 0–0.2)
BASOPHILS NFR BLD AUTO: 0.2 %
BILIRUB UR QL STRIP.AUTO: NEGATIVE
BUN BLD-MCNC: 15 MG/DL (ref 7–18)
CALCIUM BLD-MCNC: 8.7 MG/DL (ref 8.5–10.1)
CHLORIDE SERPL-SCNC: 116 MMOL/L (ref 98–112)
CO2 SERPL-SCNC: 18 MMOL/L (ref 21–32)
COLOR UR AUTO: YELLOW
CREAT BLD-MCNC: 0.75 MG/DL
EOSINOPHIL # BLD AUTO: 0.01 X10(3) UL (ref 0–0.7)
EOSINOPHIL NFR BLD AUTO: 0.2 %
ERYTHROCYTE [DISTWIDTH] IN BLOOD BY AUTOMATED COUNT: 12.5 %
GLUCOSE BLD-MCNC: 102 MG/DL (ref 70–99)
GLUCOSE UR STRIP.AUTO-MCNC: NEGATIVE MG/DL
HCG UR QL: NEGATIVE
HCT VFR BLD AUTO: 39.4 %
HGB BLD-MCNC: 12.9 G/DL
IMM GRANULOCYTES # BLD AUTO: 0.02 X10(3) UL (ref 0–1)
IMM GRANULOCYTES NFR BLD: 0.3 %
KETONES UR STRIP.AUTO-MCNC: NEGATIVE MG/DL
LEUKOCYTE ESTERASE UR QL STRIP.AUTO: NEGATIVE
LYMPHOCYTES # BLD AUTO: 1.21 X10(3) UL (ref 1–4)
LYMPHOCYTES NFR BLD AUTO: 19.3 %
MAGNESIUM SERPL-MCNC: 2.1 MG/DL (ref 1.6–2.6)
MCH RBC QN AUTO: 29.3 PG (ref 26–34)
MCHC RBC AUTO-ENTMCNC: 32.7 G/DL (ref 31–37)
MCV RBC AUTO: 89.3 FL
MONOCYTES # BLD AUTO: 0.11 X10(3) UL (ref 0.1–1)
MONOCYTES NFR BLD AUTO: 1.8 %
NEUTROPHILS # BLD AUTO: 4.92 X10 (3) UL (ref 1.5–7.7)
NEUTROPHILS # BLD AUTO: 4.92 X10(3) UL (ref 1.5–7.7)
NEUTROPHILS NFR BLD AUTO: 78.2 %
NITRITE UR QL STRIP.AUTO: NEGATIVE
OSMOLALITY SERPL CALC.SUM OF ELEC: 293 MOSM/KG (ref 275–295)
P AXIS: 36 DEGREES
P-R INTERVAL: 134 MS
PH UR STRIP.AUTO: 5 [PH] (ref 5–8)
PLATELET # BLD AUTO: 191 10(3)UL (ref 150–450)
POTASSIUM SERPL-SCNC: 4.1 MMOL/L (ref 3.5–5.1)
PROT UR STRIP.AUTO-MCNC: 30 MG/DL
Q-T INTERVAL: 432 MS
QRS DURATION: 76 MS
QTC CALCULATION (BEZET): 390 MS
R AXIS: 26 DEGREES
RBC # BLD AUTO: 4.41 X10(6)UL
RBC UR QL AUTO: NEGATIVE
SODIUM SERPL-SCNC: 141 MMOL/L (ref 136–145)
SP GR UR STRIP.AUTO: 1.03 (ref 1–1.03)
T AXIS: 44 DEGREES
TSI SER-ACNC: 1 MIU/ML (ref 0.36–3.74)
UROBILINOGEN UR STRIP.AUTO-MCNC: <2 MG/DL
VENTRICULAR RATE: 49 BPM
WBC # BLD AUTO: 6.3 X10(3) UL (ref 4–11)

## 2022-01-13 PROCEDURE — 99223 1ST HOSP IP/OBS HIGH 75: CPT | Performed by: OTHER

## 2022-01-13 PROCEDURE — 93306 TTE W/DOPPLER COMPLETE: CPT | Performed by: INTERNAL MEDICINE

## 2022-01-13 PROCEDURE — 99232 SBSQ HOSP IP/OBS MODERATE 35: CPT | Performed by: HOSPITALIST

## 2022-01-13 RX ORDER — BISMUTH SUBSALICYLATE 262 MG/1
1 TABLET, CHEWABLE ORAL
Status: DISCONTINUED | OUTPATIENT
Start: 2022-01-13 | End: 2022-01-14

## 2022-01-13 NOTE — CONSULTS
[id number] [facility] Initial Consultation    Alice Galvan Patient Status:  Observation    1990 MRN [id number]   [facility] 3NW-A Attending Kira Schneider MD   Hosp Day # 0 PCP Ana Ocampo MD     REASON FOR EVALUATION Surgery in her maternal grandmother; High Cholesterol in her mother; Hypertension in her father, maternal grandfather, and mother; Mental Disorder in her sister; Schizophrenia in her sister; Thyroid Disorder in her mother.     SOCIAL HISTORY:   reports that Phosphate (DECADRON) 4 MG/ML injection 4 mg, 4 mg, Intravenous, Q8H  Dihydroergotamine Mesylate (DHE) 1 mg in sodium chloride 0.9% 100 mL infusion, 1 mg, Intravenous, Q8H        REVIEW OF SYSTEMS:  A 10-point system was reviewed.   Pertinent positives and n neuro imaging      ASSESSMENT:     Chronic migraine, intractable, with status migrainosous    DHE protocol on hold for now due to edema and bradycardia- was having improvement       Continue home Topamax  Appreciate recs from Cardiology, possible steroid s

## 2022-01-13 NOTE — PROGRESS NOTES
[address] [facility] Preliminary Note    Alia [facility] Patient Status:  Observation    1990 MRN [id number]   [facility] 3NW-A Attending Nely Hicks MD   Hosp Day # 0 PCP Jose Navarrete MD     REASON FOR EVALUATION: mi Surgery in her maternal grandmother; High Cholesterol in her mother; Hypertension in her father, maternal grandfather, and mother; Mental Disorder in her sister; Schizophrenia in her sister; Thyroid Disorder in her mother.     SOCIAL HISTORY:   reports that Phosphate (DECADRON) 4 MG/ML injection 4 mg, 4 mg, Intravenous, Q8H  Dihydroergotamine Mesylate (DHE) 1 mg in sodium chloride 0.9% 100 mL infusion, 1 mg, Intravenous, Q8H        REVIEW OF SYSTEMS:  A 10-point system was reviewed.   Pertinent positives and n

## 2022-01-13 NOTE — PHYSICAL THERAPY NOTE
Pt eval orders, chart reviewed. Spoke with pt's rn Rojas Porter, who reports pt is up ad melo in her room with no mobility issues, and her migraine pain remains 4/10. As pt with no needs from PT at this time, will dc current orders.   Please re order if pt cond

## 2022-01-13 NOTE — H&P
[facility] HOSPITALIST  History and Physical     Yvonne Jackson Patient Status:  Emergency    1990 MRN [id number]   Location [address] Attending Jessica Avery MD   Hosp Day # 0 PCP Henri Cervantes MD     Chief Comp Cancer Father         NHL, kidney, prostate   • Allergies Sister    • Schizophrenia Sister    • High Cholesterol Mother    • Gastro-Intestinal Disorder Mother         cholecystectomy   • Thyroid Disorder Mother         small goiter   • Hypertension Mother (Temporal)   Resp 16   Ht 5' 4\" (1.626 m)   Wt 112 lb (50.8 kg)   LMP 01/03/2022   SpO2 98%   Breastfeeding No   BMI 19.22 kg/m²   General: No acute distress. Alert and oriented x 3. HEENT: Normocephalic atraumatic. Moist mucous membranes. EOM-I. PERRLA. until feeling better  - limit narcotics as able Toradol/Tylenol  - Norco and Dilaudid PRN  - Antiemetics as needed  - Supportive measures  - Neurology consulted planning DHEA protocol    # Hypokalemia  - replete    # ADHD  - not on any pharmaceutical treat

## 2022-01-13 NOTE — ED PROVIDER NOTES
Patient Seen in: [facility] Emergency Department      History   Patient presents with:  Headache    Stated Complaint: migraine    Subjective:   HPI    75-year-old female with a history of migraines and daily headaches for the past 3 months presents young adult female lying on a gurney. Vital signs were reviewed per nurse's notes. HEENT: Normocephalic atraumatic. Anicteric sclera. Oral mucosa is moist.  Oropharynx is normal.  Neck: No adenopathy or thyromegaly. Lungs are clear to auscultation.   H Prescribed:  Current Discharge Medication List                          Hospital Problems             Present on Admission  Date Reviewed: 12/16/2021          ICD-10-CM Noted POA    * (Principal) Migraine G43.909 6/26/2012 Unknown

## 2022-01-13 NOTE — PLAN OF CARE
0730: Pt appeared comfortable and seemed to tolerate DHE well after test dose. After 2nd dose pt reports headache mildly better, light/photosens does not hurt anymore. Pt reports nausea improved.  Pt became sinus ace after second dose with mild edema to h with activity and pre-medicate as appropriate  Outcome: Progressing     Problem: SAFETY ADULT - FALL  Goal: Free from fall injury  Description: INTERVENTIONS:  - Assess pt frequently for physical needs  - Identify cognitive and physical deficits and behavi

## 2022-01-13 NOTE — PROGRESS NOTES
NURSING ADMISSION NOTE    Patient admitted to SCU via transport from ER.  AOx4, VSS, headache continues, Oriented to room. IVF started per orders. Neurology consulted; orders placed. Urine samples/test sent & pending.    Fall & safety precautions initiated

## 2022-01-13 NOTE — PROGRESS NOTES
[facility] H[facility]PITAL[facility]  Progress Note     Ernesto Oliveros Patient Status:  Inpatient    1990 MRN [id number]   [facility] 3NW-A Attending Matteo Gonzalez MD   Hosp Day # 0 PCP Rubina Brock MD     Chief Complaint: OROZCO    S: Patient still Intravenous Q8H   • dihydroergotamine (DHE) IV infusion in 100ml NS  1 mg Intravenous Q8H       ASSESSMENT / PLAN:     # Status Migrainosus   - IVF until feeling better  - limit narcotics as able Toradol/Tylenol  - Norco and Dilaudid PRN  - Antiemetics as

## 2022-01-13 NOTE — ED QUICK NOTES
Orders for admission, patient is aware of plan and ready to go upstairs. Any questions, please call ED TRAY Gallegos  at extension [phone]. Vaccinated?  yes  Type of COVID test sent:rapid  COVID Suspicion level: Low      Titratable drug(s)n/a infusing:  Rate:

## 2022-01-13 NOTE — PLAN OF CARE
A&O x4. Denies any CP, EZEQUIEL, or calf pain at present. Lungs clear bilaterally. SB/NSR on tele, HRs have been in the upper 50s-60s upon assessment. Denies any lightheadedness, numbness or tingling. Abdomen soft, nontender, nondistended.  Bowel sounds active, assessment  - Modify environment to reduce risk of injury  - Provide assistive devices as appropriate  - Consider OT/PT consult to assist with strengthening/mobility  - Encourage toileting schedule  Outcome: Progressing     Problem: [address]er

## 2022-01-13 NOTE — CONSULTS
[facility] [address] [place] [address] Cardiology  Report of Consultation    Juan Carlos Hernandez Patient Status:  Observation    1990 MRN [id number]   [facility] 3NW-A Attending Taylor Byers MD   Hosp Day # 0 PCP Meseret Ivey MD     Reason for [facility] encounter. topiramate 25 MG Oral Tab, Take 75 mg by mouth at bedtime. , Disp: , Rfl:   ondansetron (ZOFRAN) 4 mg tablet, Once a day to be taken with migraine medication, Disp: 20 tablet, Rfl: 0  erenumab-aooe (AIMOVIG) 70 MG/ML Subcutaneous, Inject 1 mL (7 equal in a symmetric fashion. Neurologic: Alert and oriented, normal affect. No gross deficit appreciated. Integument:  No visible rashes are appreciated.       Laboratories and Data:   Labs:    Recent Labs   Lab 01/12/22  1807 01/13/22  0617   GLU 89 10 to increased vagal tone from headaches, possible effect of oral corticosteroids and dihydroergotamine. Since she is clinically stable, without symptoms from the bradycardia, she can continue treatment with these if necessary.  Monitor closely on telemetry a

## 2022-01-14 VITALS
OXYGEN SATURATION: 100 % | DIASTOLIC BLOOD PRESSURE: 58 MMHG | HEIGHT: 64 IN | TEMPERATURE: 98 F | RESPIRATION RATE: 18 BRPM | HEART RATE: 44 BPM | BODY MASS INDEX: 19.12 KG/M2 | WEIGHT: 112 LBS | SYSTOLIC BLOOD PRESSURE: 94 MMHG

## 2022-01-14 LAB
ANION GAP SERPL CALC-SCNC: 5 MMOL/L (ref 0–18)
BUN BLD-MCNC: 8 MG/DL (ref 7–18)
CALCIUM BLD-MCNC: 8.7 MG/DL (ref 8.5–10.1)
CHLORIDE SERPL-SCNC: 115 MMOL/L (ref 98–112)
CO2 SERPL-SCNC: 24 MMOL/L (ref 21–32)
CREAT BLD-MCNC: 0.67 MG/DL
GLUCOSE BLD-MCNC: 91 MG/DL (ref 70–99)
OSMOLALITY SERPL CALC.SUM OF ELEC: 296 MOSM/KG (ref 275–295)
POTASSIUM SERPL-SCNC: 3.5 MMOL/L (ref 3.5–5.1)
SODIUM SERPL-SCNC: 144 MMOL/L (ref 136–145)

## 2022-01-14 PROCEDURE — 99239 HOSP IP/OBS DSCHRG MGMT >30: CPT | Performed by: HOSPITALIST

## 2022-01-14 PROCEDURE — 99233 SBSQ HOSP IP/OBS HIGH 50: CPT | Performed by: OTHER

## 2022-01-14 RX ORDER — RIZATRIPTAN BENZOATE 10 MG/1
TABLET ORAL
Qty: 10 TABLET | Refills: 5 | Status: SHIPPED | OUTPATIENT
Start: 2022-01-14

## 2022-01-14 RX ORDER — DIVALPROEX SODIUM 250 MG/1
TABLET, EXTENDED RELEASE ORAL
Qty: 30 TABLET | Refills: 5 | Status: SHIPPED | OUTPATIENT
Start: 2022-01-14

## 2022-01-14 RX ORDER — MAGNESIUM SULFATE HEPTAHYDRATE 40 MG/ML
2 INJECTION, SOLUTION INTRAVENOUS EVERY 8 HOURS
Status: DISCONTINUED | OUTPATIENT
Start: 2022-01-14 | End: 2022-01-14

## 2022-01-14 RX ORDER — RIZATRIPTAN BENZOATE 5 MG/1
5 TABLET, ORALLY DISINTEGRATING ORAL 2 TIMES DAILY PRN
Status: DISCONTINUED | OUTPATIENT
Start: 2022-01-14 | End: 2022-01-14

## 2022-01-14 RX ORDER — POTASSIUM CHLORIDE 20 MEQ/1
40 TABLET, EXTENDED RELEASE ORAL EVERY 4 HOURS
Status: DISCONTINUED | OUTPATIENT
Start: 2022-01-14 | End: 2022-01-14

## 2022-01-14 NOTE — PROGRESS NOTES
[facility]  Cardiology Progress Note    José Manuel Adrian Patient Status:  Inpatient    1990 MRN [id number]   [facility] 3NW-A Attending Padmini Colon MD   Hosp Day # 1 PCP Damon Lezama MD       Subjective: Headaches persist. 75 mg, Oral, Nightly  ketorolac (TORADOL) injection 15 mg, 15 mg, Intravenous, Q6H PRN  ondansetron (ZOFRAN) injection 4 mg, 4 mg, Intravenous, Q8H        Problems:  Patient Active Problem List:     Exercise-induced asthma     Status migrainosus     ADD (a ace, high 40s to low 50s    EKG 1/13/22: SB HR 49bpm    Echo: 1/13/22:  1. Left ventricle:  The cavity size was normal. Wall thickness was normal.      Systolic function was normal. The estimated ejection fraction was 60-65%.      Doppler parameters are c

## 2022-01-14 NOTE — PAYOR COMM NOTE
--------------  ADMISSION REVIEW     Payor: Vini Mccall [employer]  Subscriber #:  [id number]  Authorization Number: [id number]    Admit date: 1/13/22  Admit time:  2:03 PM       REVIEW DOCUMENTATION:     ED Provider Notes      ED Provider Notes Pulse 81   Resp 16   Temp 99.1 °F (37.3 °C)   Temp src Temporal   SpO2 98 %   O2 Device None (Room air)       Current:/66   Pulse 81   Temp 99.1 °F (37.3 °C) (Temporal)   Resp 16   Ht 162.6 cm (5' 4\")   Wt 50.8 kg   LMP 01/03/2022   SpO2 98%   Camila Status migrainosus.   Admission disposition: 1/12/2022  6:59 PM                                   Disposition and Plan     Clinical Impression:  Other migraine with status migrainosus, intractable  (primary encounter diagnosis)     Disposition:  Admit  1/ symptoms. No muscle weakness. No vision change except with Aura. No chest pain or difficulty breathing. No fever or cough.     Past Medical History:  Past Medical History:   Diagnosis Date   • Allergic rhinitis due to pollen    • Asthma     no flare ups f (MAXALT) 10 MG Oral Tab, Take 1 tab po at onset of headache, may repeat in 2 hours if headache recurs, max 2 tabs in 24 hours, Disp: 10 tablet, Rfl: 1  ondansetron (ZOFRAN) 4 mg tablet, Once a day to be taken with migraine medication, Disp: 20 tablet, Rfl: result is older than the maximum 7 days allowed. ). No results for input(s): PTP, INR in the last 168 hours.     COVID-19 Lab Results    COVID-19  Lab Results   Component Value Date    COVID19 Detected (A) 03/08/2021    COVID19 Not Detected 02/01/2021 RN      Dihydroergotamine Mesylate (DHE) 1 mg in sodium chloride 0.9% 100 mL infusion     Date Action Dose Route User    1/13/2022 1503 Given 1 mg Intravenous Chelita Jarrell RN      enoxaparin (LOVENOX) 40 MG/0.4ML injection 40 mg     Date Action Dose Route EKG and echo unremarkable. SB likely 2/2 steroids pt was given. Will discuss w/ Dr. Amanda Sheppard, but likely okay to resume DHE, but may want to hold steroids. 2. Monitor tele  3.  Monitor electrolytes     Abelardo [place], [place]  1/13/2022  11:15 AM        ADDEND

## 2022-01-14 NOTE — PROGRESS NOTES
[id number] [facility]  1/14/2022    4:44 PM      Got 1 dose of Maxalt  Depacon and Mag Cristino and the headache is much better  There were moments that she was not having headache at all    Send karlee

## 2022-01-14 NOTE — PLAN OF CARE
Pt resting in bed. C/o of slight headache, states Am meds helped. SB on tele. Pt denies nausea. Reports she had loose stool yesterday and has an upset stomach today. Pt asked to inform RN of any loose stools today.  poc updated, pt verbalized understanding appropriate  - Identify discharge learning needs (meds, wound care, etc)  - Arrange for interpreters to assist at discharge as needed  - Consider post-discharge preferences of patient/family/discharge partner  - Complete POLST form as appropriate  - Assess

## 2022-01-14 NOTE — PAYOR COMM NOTE
--------------  CONTINUED STAY REVIEW    Payor: Torito STANTON [employer]  Subscriber #:  [id number]  Authorization Number: [id number]    Admit date: 1/13/22  Admit time:  2:03 PM    Admitting Physician: Xiao Singleton DO  Attending Physician:  Danika 01/14/22 0826 98.5 °F (36.9 °C) 42 17 92/55 100 % — None (Room air) 0 L/min [place]    01/14/22 0610 98.2 °F (36.8 °C) 40 16 93/51 96 % — None (Room air) —     01/14/22 0100 98.4 °F (36.9 °C) 37 14 101/55 94 % — None (Room air) —     01/13/22 2013 98.3 °F ( m), weight 112 lb (50.8 kg), last menstrual period 01/03/2022, SpO2 100 %, not currently breastfeeding.     Medications:  Bismuth Subsalicylate (PEPTO-BISMOL) chew tab CHEW 262 mg, 1 tablet, Oral, Q1H PRN  dextrose 5 %-0.45 % NaCl infusion, , Intravenous, the brain.      Assessment/Plan:  · Intractable migraine  ?  DHE protocol started on admit, but after second dose, she was bradycardic and had bilateral hand edema= remains on hold  § Cardiology consulted   § Could be due to increased vagal tone from headac

## 2022-01-14 NOTE — PROGRESS NOTES
[facility] H[facility]PITAL[facility]  Progress Note     Krissy Ivonnerj Patient Status:  Inpatient    1990 MRN [id number]   [facility] 3NW-A Attending Erma Moise MD   Hosp Day # 1 PCP Candida Neumann MD     Chief Complaint: OROZCO    S: Patient still Nightly   • ondansetron  4 mg Intravenous Q8H       ASSESSMENT / PLAN:     # Status Migrainosus   - IVF until feeling better  - limit narcotics as able Toradol/Tylenol  - Norco and Dilaudid PRN  - Antiemetics as needed  - Supportive measures  - Neurology c

## 2022-01-14 NOTE — PLAN OF CARE
Pt vitals stable, A&O x4. Sinus ace in the mid-high 30s, asymptomatic. Patient stated being anxious about HR, ensured her that we are keeping an eye on it and to call if she starts exhibiting symptoms.  Denied chest pain, shortness of breath, and calf raheem Progressing  1/13/2022 2346 by Giselle Hood RN  Outcome: Progressing     Problem: SAFETY ADULT - FALL  Goal: Free from fall injury  Description: INTERVENTIONS:  - Assess pt frequently for physical needs  - Identify cognitive and physical deficits and b

## 2022-01-14 NOTE — DISCHARGE SUMMARY
[facility]  DISCHARGE SUMMARY     Ernesto Oliveros Patient Status:  Inpatient    1990 MRN [id number]   [facility] 3NW-A Attending Matteo Gonzalez MD   Hosp Day # 1 PCP Rubina Brock MD     Date of Admission: 2022  Date o for her. She denies any other somatic symptoms. No muscle weakness. No vision change except with Aura. No chest pain or difficulty breathing. No fever or cough.       Brief Synopsis: pt w/ uncontrolled migraines, admitted for DHE protocol.  Unfortunately reviewed: no    Follow-up appointment:   Neal Sequeira, [address] [facility]  Tamar Ash [address]     Schedule an appointment as soon as possible for a visit      Chin Camp MD  [address] [address] [address] [phone]

## 2022-01-14 NOTE — PROGRESS NOTES
[address] [facility] Progress Note    Michael Robison Patient Status:  Inpatient    1990 MRN [id number]   [facility] 3NW-A Attending Carlitos Goldstein MD   Hosp Day # 1 PCP Bill Whitt MD     Patient relates that the heada rate 17, height 5' 4\" (1.626 m), weight 112 lb (50.8 kg), last menstrual period 01/03/2022, SpO2 100 %, not currently breastfeeding.     Medications:  Bismuth Subsalicylate (PEPTO-BISMOL) chew tab CHEW 262 mg, 1 tablet, Oral, Q1H PRN  dextrose 5 %-0.45 % N Otherwise stable appearance of the brain.      Assessment/Plan:  · Intractable migraine  · DHE protocol started on admit, but after second dose, she was bradycardic and had bilateral hand edema= remains on hold  · Cardiology consulted   · Could be due to in

## 2022-01-15 NOTE — PLAN OF CARE
Written and verbal discharge instructions given to patient and  verbalize understanding. Prescription sent to pharmacy. Patient left floor in stable condition via wc, accompanied by staff.

## 2022-01-17 NOTE — PAYOR COMM NOTE
Discharge Notification    Patient Name: Otilia Duran  Payor: Sigrid Phelps [facility] [address] [place] #: [id number]  Authorization Number: [id number]  Admit Date/Time: 1/12/2022 5:49 PM  Discharge Date/Time: 1/14/2022 6:46 PM

## 2022-02-08 ENCOUNTER — TELEPHONE (OUTPATIENT)
Dept: INTERNAL MEDICINE CLINIC | Facility: CLINIC | Age: 32
End: 2022-02-08

## 2022-02-08 RX ORDER — VALACYCLOVIR HYDROCHLORIDE 1 G/1
1000 TABLET, FILM COATED ORAL 2 TIMES DAILY
Qty: 20 TABLET | Refills: 0 | Status: SHIPPED | OUTPATIENT
Start: 2022-02-08 | End: 2022-02-09

## 2022-02-08 NOTE — TELEPHONE ENCOUNTER
Patient states when she was young she used to get canker sores a lot then went a long time without them but now has 5 in her mouth that are really painful. Pt has an appt scheduled with AD for 2/9/22 at 8:40am.  Pt is not using anything right now but painful with eating or drinking fluids. Pt notified in the meantime she can use Orajel Mouthwash for mouth sores over the counter to try to calm down the canker sores. Pt notified to continue her appt with AD for 2/9/22. Pt verbalizes understanding. FYI to AD. LOV 8/17/21 with AD.

## 2022-02-09 ENCOUNTER — TELEPHONE (OUTPATIENT)
Dept: NEUROLOGY | Facility: CLINIC | Age: 32
End: 2022-02-09

## 2022-02-09 ENCOUNTER — OFFICE VISIT (OUTPATIENT)
Dept: INTERNAL MEDICINE CLINIC | Facility: CLINIC | Age: 32
End: 2022-02-09
Payer: COMMERCIAL

## 2022-02-09 VITALS
OXYGEN SATURATION: 98 % | TEMPERATURE: 97 F | SYSTOLIC BLOOD PRESSURE: 95 MMHG | WEIGHT: 115 LBS | RESPIRATION RATE: 16 BRPM | HEART RATE: 80 BPM | DIASTOLIC BLOOD PRESSURE: 78 MMHG | HEIGHT: 64.17 IN | BODY MASS INDEX: 19.63 KG/M2

## 2022-02-09 DIAGNOSIS — K13.70 ORAL MUCOSAL LESION: Primary | ICD-10-CM

## 2022-02-09 PROCEDURE — 3008F BODY MASS INDEX DOCD: CPT | Performed by: INTERNAL MEDICINE

## 2022-02-09 PROCEDURE — 3074F SYST BP LT 130 MM HG: CPT | Performed by: INTERNAL MEDICINE

## 2022-02-09 PROCEDURE — 99213 OFFICE O/P EST LOW 20 MIN: CPT | Performed by: INTERNAL MEDICINE

## 2022-02-09 PROCEDURE — 3078F DIAST BP <80 MM HG: CPT | Performed by: INTERNAL MEDICINE

## 2022-02-09 RX ORDER — DEXAMETHASONE 0.5 MG/5ML
ELIXIR ORAL
Qty: 70 ML | Refills: 0 | Status: SHIPPED | OUTPATIENT
Start: 2022-02-09 | End: 2022-02-16

## 2022-02-09 RX ORDER — FREMANEZUMAB-VFRM 225 MG/1.5ML
225 INJECTION SUBCUTANEOUS
Qty: 1.5 ML | Refills: 5 | Status: SHIPPED | OUTPATIENT
Start: 2022-02-09 | End: 2023-02-09

## 2022-02-09 RX ORDER — DEXAMETHASONE 0.5 MG/5ML
0.5 ELIXIR ORAL 2 TIMES DAILY
Qty: 70 ML | Refills: 0 | Status: SHIPPED | OUTPATIENT
Start: 2022-02-09 | End: 2022-02-09

## 2022-02-09 NOTE — TELEPHONE ENCOUNTER
Patient calling saying her insurance wants her to change to [facility] but she wants to stay on Ajov. She called her insurance and the cost would be affordable and she is requesting to return to [address] [place] [address]. Offered patient co-pay card and sample until approval received from insurance. Patient to present to office for sample and medication. Patient unable to take Aimovig d/t side effects of constipation. Patient with constipation.

## 2022-02-09 NOTE — TELEPHONE ENCOUNTER
Patient calling, advised that she was switched from [address] [place] [address] to [address] [place] [address], but would like to stay on the avjoCloudvu if possible. Has an additional question as well. Please advise.

## 2022-02-10 ENCOUNTER — MOBILE ENCOUNTER (OUTPATIENT)
Dept: INTERNAL MEDICINE CLINIC | Facility: CLINIC | Age: 32
End: 2022-02-10

## 2022-02-10 RX ORDER — LIDOCAINE HYDROCHLORIDE 20 MG/ML
5 SOLUTION OROPHARYNGEAL
Qty: 100 ML | Refills: 0 | Status: SHIPPED | OUTPATIENT
Start: 2022-02-10 | End: 2022-02-17

## 2022-02-16 ENCOUNTER — MOBILE ENCOUNTER (OUTPATIENT)
Dept: INTERNAL MEDICINE CLINIC | Facility: CLINIC | Age: 32
End: 2022-02-16

## 2022-02-16 RX ORDER — DEXAMETHASONE 0.5 MG/5ML
ELIXIR ORAL
Qty: 140 ML | Refills: 0 | Status: SHIPPED | OUTPATIENT
Start: 2022-02-16

## 2022-02-16 RX ORDER — DEXAMETHASONE 0.5 MG/5ML
ELIXIR ORAL
Qty: 70 ML | Refills: 0 | Status: SHIPPED | OUTPATIENT
Start: 2022-02-16 | End: 2022-02-16

## 2022-04-05 ENCOUNTER — TELEPHONE (OUTPATIENT)
Dept: NEUROLOGY | Facility: CLINIC | Age: 32
End: 2022-04-05

## 2022-04-05 NOTE — TELEPHONE ENCOUNTER
RN informed the patient we can write a letter appealing the denial of the Ajovy. Pt is unable to take Aimovig. RN informed the patient we have samples she can . Previous TE on 4/5/2022  During the call pt reported she is experiencing hair loss on the Depakote. RN advised to make up a follow-up visit with the provider and she can discuss this issue. Per [place] pt does not need to dose down on the medication, pt can stop the dose. RN called the patient and informed her. Pt wanted to know what she should do if she starts to have a migraine. RN informed her to call the office and we can talk to the provider. Pt verbalized understanding and did not have any further questions.

## 2022-04-06 ENCOUNTER — TELEPHONE (OUTPATIENT)
Dept: NEUROLOGY | Facility: CLINIC | Age: 32
End: 2022-04-06

## 2022-04-15 NOTE — TELEPHONE ENCOUNTER
Signed appeal letter mailed to:    Appeal Coordinator  [address] [facility]  Drive Management Appeals Dept  [address] [facility]    Unclear if this is the desired destination.  Denial paperwork was not kept from initial denial.

## 2022-05-06 ENCOUNTER — PATIENT MESSAGE (OUTPATIENT)
Dept: NEUROLOGY | Facility: CLINIC | Age: 32
End: 2022-05-06

## 2022-05-06 ENCOUNTER — TELEPHONE (OUTPATIENT)
Dept: NEUROLOGY | Facility: CLINIC | Age: 32
End: 2022-05-06

## 2022-05-06 NOTE — TELEPHONE ENCOUNTER
Always got a reaction from [address] [place] [address], goes away after a day. Right after administration medication burns and gets \"hives\". Around the site, under the skin, the injection is hard, lumpy for 1-2 days. Diameter of site is 6 inches and goes down her leg. Otherwise medication is working well at controlling migraines, does not wish to stop. At patient's request, she presents to the office for demonstration and discussion. Currently hard lump appears to be 1.5 inches in diameter. She denies SOB, difficulty swallowing or any other symptoms. She has pictures detailing the raised welt-like bumps and will send them for MD review via [employer]. She will continue to observe injection effects and report as necessary.

## 2022-05-10 NOTE — TELEPHONE ENCOUNTER
Spoke to patient and injection site reaction fully resolved in a day and can not even tell that she did an injection. Explained ok to continue the Ajovy. If there is any worsening of the reactions instructed to call the office. She expressed full understanding.

## 2022-06-03 NOTE — TELEPHONE ENCOUNTER
Pt called to get an update on the Ajovy appeal process. Pt is out of samples at this time. Call pt to discuss.

## 2022-06-07 ENCOUNTER — TELEPHONE (OUTPATIENT)
Dept: NEUROLOGY | Facility: CLINIC | Age: 32
End: 2022-06-07

## 2022-06-07 DIAGNOSIS — G43.109 MIGRAINE WITH AURA AND WITHOUT STATUS MIGRAINOSUS, NOT INTRACTABLE: Primary | ICD-10-CM

## 2022-06-07 RX ORDER — DEXAMETHASONE 0.5 MG/5ML
ELIXIR ORAL
Qty: 140 ML | Refills: 0 | Status: SHIPPED | OUTPATIENT
Start: 2022-06-07

## 2022-06-07 NOTE — TELEPHONE ENCOUNTER
RN spoke to the patient and was informed she needs Ajovy. RN advised we could give her a sample. Pt is going to have her  pick it up today or she will pick it up tomorrow. Pt needs a copay card as well.

## 2022-06-07 NOTE — TELEPHONE ENCOUNTER
Last VISIT 04/20/22    Last CPE Not on file    Last REFILL 02/16/22   Dexamethasone 0.5 MG/5ML Oral Elixir (Discontinued) 70 mL 0        Last LABS 01/14/22 BMP done    No future appointments. Per PROTOCOL? Not on protocol      Please Approve or Deny.

## 2022-06-07 NOTE — TELEPHONE ENCOUNTER
Request received from pharmacy stating insurance requires generic medication be dispensed. The medication requested is Ajovy. No generic at this time.

## 2022-06-08 NOTE — TELEPHONE ENCOUNTER
Patient presented to office to receive sample of Ajovy and co-pay assistance card. Ajovy 225mg/1.5 ml    Lot: [id number]  Exp: July 2022    Authorization pending.

## 2022-06-16 NOTE — TELEPHONE ENCOUNTER
Approval received for patient use of Ajovy 225 g/1.5ml injectors. Approval granted: 06/16/2022 - 06/16/2023. Patient notified via My chart message.

## 2022-06-29 ENCOUNTER — OFFICE VISIT (OUTPATIENT)
Dept: INTERNAL MEDICINE CLINIC | Facility: CLINIC | Age: 32
End: 2022-06-29
Payer: COMMERCIAL

## 2022-06-29 ENCOUNTER — HOSPITAL ENCOUNTER (OUTPATIENT)
Dept: GENERAL RADIOLOGY | Facility: HOSPITAL | Age: 32
Discharge: HOME | End: 2022-06-29
Attending: INTERNAL MEDICINE
Payer: COMMERCIAL

## 2022-06-29 VITALS
DIASTOLIC BLOOD PRESSURE: 58 MMHG | WEIGHT: 115 LBS | SYSTOLIC BLOOD PRESSURE: 102 MMHG | HEIGHT: 64 IN | BODY MASS INDEX: 19.63 KG/M2 | RESPIRATION RATE: 16 BRPM | OXYGEN SATURATION: 98 % | TEMPERATURE: 98 F | HEART RATE: 64 BPM

## 2022-06-29 DIAGNOSIS — M54.9 SPINAL PAIN: ICD-10-CM

## 2022-06-29 DIAGNOSIS — M54.9 SPINAL PAIN: Primary | ICD-10-CM

## 2022-06-29 PROCEDURE — 3008F BODY MASS INDEX DOCD: CPT | Performed by: INTERNAL MEDICINE

## 2022-06-29 PROCEDURE — 72100 X-RAY EXAM L-S SPINE 2/3 VWS: CPT | Performed by: INTERNAL MEDICINE

## 2022-06-29 PROCEDURE — 3074F SYST BP LT 130 MM HG: CPT | Performed by: INTERNAL MEDICINE

## 2022-06-29 PROCEDURE — 72072 X-RAY EXAM THORAC SPINE 3VWS: CPT | Performed by: INTERNAL MEDICINE

## 2022-06-29 PROCEDURE — 3078F DIAST BP <80 MM HG: CPT | Performed by: INTERNAL MEDICINE

## 2022-06-29 PROCEDURE — 99213 OFFICE O/P EST LOW 20 MIN: CPT | Performed by: INTERNAL MEDICINE

## 2022-06-29 RX ORDER — ACETAMINOPHEN 500 MG
500 TABLET ORAL EVERY 6 HOURS PRN
COMMUNITY

## 2022-08-24 RX ORDER — DEXTROAMPHETAMINE SACCHARATE, AMPHETAMINE ASPARTATE MONOHYDRATE, DEXTROAMPHETAMINE SULFATE AND AMPHETAMINE SULFATE 2.5; 2.5; 2.5; 2.5 MG/1; MG/1; MG/1; MG/1
10 CAPSULE, EXTENDED RELEASE ORAL DAILY
Qty: 30 CAPSULE | Refills: 0 | Status: SHIPPED | OUTPATIENT
Start: 2022-08-24 | End: 2022-09-23

## 2022-08-24 NOTE — TELEPHONE ENCOUNTER
Last VISIT 06/29/22    Last CPE Not on file    Last REFILL 07/27/22 qty 30 w/0 refills    Last LABS 01/14/22 BMP done    No future appointments. Per PROTOCOL? Not on protocol      Please Approve or Deny.

## 2022-08-24 NOTE — TELEPHONE ENCOUNTER
[facility] [address] [address] [facility], [phone], [phone]    Pt stated she has 2 capsules left of the below and needs a refill.     amphetamine-dextroamphetamine ER (ADDERALL XR) 10 MG Oral Capsule SR 24 Hr () 30 capsule 0 2022

## 2022-10-18 ENCOUNTER — PATIENT MESSAGE (OUTPATIENT)
Dept: INTERNAL MEDICINE CLINIC | Facility: CLINIC | Age: 32
End: 2022-10-18

## 2022-10-20 RX ORDER — DEXTROAMPHETAMINE SACCHARATE, AMPHETAMINE ASPARTATE MONOHYDRATE, DEXTROAMPHETAMINE SULFATE AND AMPHETAMINE SULFATE 2.5; 2.5; 2.5; 2.5 MG/1; MG/1; MG/1; MG/1
10 CAPSULE, EXTENDED RELEASE ORAL DAILY
Qty: 30 CAPSULE | Refills: 0 | Status: SHIPPED | OUTPATIENT
Start: 2022-10-20 | End: 2022-11-19

## 2022-10-20 NOTE — TELEPHONE ENCOUNTER
From: Elizabeth Stephen  To:  Colonel dEson MD  Sent: 10/18/2022 1:57 PM CDT  Subject: Adderall refill     Hello, just requesting a refill of my adderall please.   -Kasi Burnett

## 2022-10-20 NOTE — TELEPHONE ENCOUNTER
Last VISIT 06/29/22    Last CPE  Not on file    Last REFILL 08/24/22 qty 30 w/0 refills    Last LABS 01/14/22 BMP done    No future appointments. Please Approve or Deny.

## 2022-10-31 ENCOUNTER — TELEPHONE (OUTPATIENT)
Dept: INTERNAL MEDICINE CLINIC | Facility: HOSPITAL | Age: 32
End: 2022-10-31

## 2022-10-31 DIAGNOSIS — Z20.822 SUSPECTED COVID-19 VIRUS INFECTION: Primary | ICD-10-CM

## 2022-11-01 ENCOUNTER — TELEMEDICINE (OUTPATIENT)
Dept: INTERNAL MEDICINE CLINIC | Facility: CLINIC | Age: 32
End: 2022-11-01
Payer: COMMERCIAL

## 2022-11-01 ENCOUNTER — LAB ENCOUNTER (OUTPATIENT)
Dept: LAB | Facility: HOSPITAL | Age: 32
End: 2022-11-01
Attending: PREVENTIVE MEDICINE

## 2022-11-01 DIAGNOSIS — Z20.822 SUSPECTED COVID-19 VIRUS INFECTION: ICD-10-CM

## 2022-11-01 DIAGNOSIS — J01.00 ACUTE NON-RECURRENT MAXILLARY SINUSITIS: Primary | ICD-10-CM

## 2022-11-01 LAB — SARS-COV-2 RNA RESP QL NAA+PROBE: NOT DETECTED

## 2022-11-01 PROCEDURE — 99213 OFFICE O/P EST LOW 20 MIN: CPT | Performed by: PHYSICIAN ASSISTANT

## 2022-11-01 RX ORDER — AZITHROMYCIN 250 MG/1
TABLET, FILM COATED ORAL
Qty: 6 TABLET | Refills: 0 | Status: SHIPPED | OUTPATIENT
Start: 2022-11-01 | End: 2022-11-06

## 2022-11-01 RX ORDER — ALBUTEROL SULFATE 90 UG/1
2 AEROSOL, METERED RESPIRATORY (INHALATION) EVERY 4 HOURS PRN
Qty: 1 EACH | Refills: 1 | Status: SHIPPED | OUTPATIENT
Start: 2022-11-01

## 2022-12-16 DIAGNOSIS — G43.109 MIGRAINE WITH AURA AND WITHOUT STATUS MIGRAINOSUS, NOT INTRACTABLE: ICD-10-CM

## 2022-12-16 RX ORDER — FREMANEZUMAB-VFRM 225 MG/1.5ML
INJECTION SUBCUTANEOUS
Qty: 1.5 ML | Refills: 0 | Status: SHIPPED | OUTPATIENT
Start: 2022-12-16

## 2022-12-26 ENCOUNTER — OFFICE VISIT (OUTPATIENT)
Dept: FAMILY MEDICINE CLINIC | Facility: CLINIC | Age: 32
End: 2022-12-26
Payer: COMMERCIAL

## 2022-12-26 VITALS
RESPIRATION RATE: 16 BRPM | WEIGHT: 112 LBS | TEMPERATURE: 98 F | HEART RATE: 72 BPM | HEIGHT: 64 IN | DIASTOLIC BLOOD PRESSURE: 64 MMHG | OXYGEN SATURATION: 100 % | BODY MASS INDEX: 19.12 KG/M2 | SYSTOLIC BLOOD PRESSURE: 110 MMHG

## 2022-12-26 DIAGNOSIS — J02.9 SORE THROAT: Primary | ICD-10-CM

## 2022-12-26 DIAGNOSIS — J35.8 TONSILLITH: ICD-10-CM

## 2022-12-26 LAB
CONTROL LINE PRESENT WITH A CLEAR BACKGROUND (YES/NO): YES YES/NO
STREP GRP A CUL-SCR: NEGATIVE

## 2022-12-26 NOTE — PATIENT INSTRUCTIONS
Use warm saltwater gargles to help with tonsil pain and to help remove tonsilliths. Work on adequate allergy control to help reduce risk of tonsillith formation. Tylenol or ibuprofen for pain. If no better in 2-3 days follow-up for further evaluation.

## 2023-01-09 DIAGNOSIS — G43.109 MIGRAINE WITH AURA AND WITHOUT STATUS MIGRAINOSUS, NOT INTRACTABLE: ICD-10-CM

## 2023-01-10 DIAGNOSIS — G43.109 MIGRAINE WITH AURA AND WITHOUT STATUS MIGRAINOSUS, NOT INTRACTABLE: ICD-10-CM

## 2023-01-10 RX ORDER — FREMANEZUMAB-VFRM 225 MG/1.5ML
INJECTION SUBCUTANEOUS
Qty: 1.5 ML | Refills: 0 | Status: SHIPPED | OUTPATIENT
Start: 2023-01-10

## 2023-01-10 RX ORDER — FREMANEZUMAB-VFRM 225 MG/1.5ML
INJECTION SUBCUTANEOUS
Qty: 1.5 ML | Refills: 2 | OUTPATIENT
Start: 2023-01-10

## 2023-01-12 ENCOUNTER — TELEPHONE (OUTPATIENT)
Dept: INTERNAL MEDICINE CLINIC | Facility: CLINIC | Age: 33
End: 2023-01-12

## 2023-01-12 RX ORDER — CIPROFLOXACIN 500 MG/1
500 TABLET, FILM COATED ORAL 2 TIMES DAILY
Qty: 6 TABLET | Refills: 0 | Status: SHIPPED | OUTPATIENT
Start: 2023-01-12

## 2023-01-13 ENCOUNTER — PATIENT MESSAGE (OUTPATIENT)
Dept: INTERNAL MEDICINE CLINIC | Facility: CLINIC | Age: 33
End: 2023-01-13

## 2023-01-13 RX ORDER — DEXTROAMPHETAMINE SACCHARATE, AMPHETAMINE ASPARTATE MONOHYDRATE, DEXTROAMPHETAMINE SULFATE AND AMPHETAMINE SULFATE 2.5; 2.5; 2.5; 2.5 MG/1; MG/1; MG/1; MG/1
10 CAPSULE, EXTENDED RELEASE ORAL DAILY
Qty: 30 CAPSULE | Refills: 0 | Status: SHIPPED | OUTPATIENT
Start: 2023-01-13 | End: 2023-02-12

## 2023-01-13 RX ORDER — DEXTROAMPHETAMINE SACCHARATE, AMPHETAMINE ASPARTATE MONOHYDRATE, DEXTROAMPHETAMINE SULFATE AND AMPHETAMINE SULFATE 2.5; 2.5; 2.5; 2.5 MG/1; MG/1; MG/1; MG/1
10 CAPSULE, EXTENDED RELEASE ORAL DAILY
Qty: 30 CAPSULE | Refills: 0 | Status: SHIPPED | OUTPATIENT
Start: 2023-02-13 | End: 2023-03-15

## 2023-01-13 RX ORDER — DEXTROAMPHETAMINE SACCHARATE, AMPHETAMINE ASPARTATE MONOHYDRATE, DEXTROAMPHETAMINE SULFATE AND AMPHETAMINE SULFATE 2.5; 2.5; 2.5; 2.5 MG/1; MG/1; MG/1; MG/1
10 CAPSULE, EXTENDED RELEASE ORAL DAILY
Qty: 30 CAPSULE | Refills: 0 | Status: SHIPPED | OUTPATIENT
Start: 2023-03-16 | End: 2023-04-15

## 2023-01-13 NOTE — TELEPHONE ENCOUNTER
From: Kiet Hsu  To:  Jose Navarrete MD  Sent: 1/13/2023 8:56 AM CST  Subject: Aderall refill    Hello, just requesting a refill of my aderall please

## 2023-01-20 ENCOUNTER — LAB ENCOUNTER (OUTPATIENT)
Dept: LAB | Age: 33
End: 2023-01-20
Attending: INTERNAL MEDICINE
Payer: COMMERCIAL

## 2023-01-20 DIAGNOSIS — Z13.228 SCREENING FOR METABOLIC DISORDER: ICD-10-CM

## 2023-01-20 DIAGNOSIS — Z13.0 SCREENING FOR BLOOD DISEASE: ICD-10-CM

## 2023-01-20 DIAGNOSIS — Z13.29 SCREENING FOR THYROID DISORDER: ICD-10-CM

## 2023-01-20 DIAGNOSIS — Z00.00 ENCOUNTER FOR ANNUAL PHYSICAL EXAM: ICD-10-CM

## 2023-01-20 DIAGNOSIS — Z13.220 SCREENING FOR CHOLESTEROL LEVEL: ICD-10-CM

## 2023-01-20 LAB
ALBUMIN SERPL-MCNC: 4.1 G/DL (ref 3.4–5)
ALBUMIN/GLOB SERPL: 1.1 {RATIO} (ref 1–2)
ALP LIVER SERPL-CCNC: 52 U/L
ALT SERPL-CCNC: 17 U/L
ANION GAP SERPL CALC-SCNC: 7 MMOL/L (ref 0–18)
AST SERPL-CCNC: 18 U/L (ref 15–37)
BASOPHILS # BLD AUTO: 0.06 X10(3) UL (ref 0–0.2)
BASOPHILS NFR BLD AUTO: 1 %
BILIRUB SERPL-MCNC: 1.1 MG/DL (ref 0.1–2)
BUN BLD-MCNC: 14 MG/DL (ref 7–18)
CALCIUM BLD-MCNC: 9.3 MG/DL (ref 8.5–10.1)
CHLORIDE SERPL-SCNC: 107 MMOL/L (ref 98–112)
CHOLEST SERPL-MCNC: 166 MG/DL (ref ?–200)
CO2 SERPL-SCNC: 24 MMOL/L (ref 21–32)
CREAT BLD-MCNC: 0.72 MG/DL
EOSINOPHIL # BLD AUTO: 0.22 X10(3) UL (ref 0–0.7)
EOSINOPHIL NFR BLD AUTO: 3.6 %
ERYTHROCYTE [DISTWIDTH] IN BLOOD BY AUTOMATED COUNT: 11.9 %
FASTING PATIENT LIPID ANSWER: YES
FASTING STATUS PATIENT QL REPORTED: YES
GFR SERPLBLD BASED ON 1.73 SQ M-ARVRAT: 112 ML/MIN/1.73M2 (ref 60–?)
GLOBULIN PLAS-MCNC: 3.6 G/DL (ref 2.8–4.4)
GLUCOSE BLD-MCNC: 80 MG/DL (ref 70–99)
HCT VFR BLD AUTO: 39.4 %
HDLC SERPL-MCNC: 84 MG/DL (ref 40–59)
HGB BLD-MCNC: 13.7 G/DL
IMM GRANULOCYTES # BLD AUTO: 0.02 X10(3) UL (ref 0–1)
IMM GRANULOCYTES NFR BLD: 0.3 %
LDLC SERPL CALC-MCNC: 75 MG/DL (ref ?–100)
LYMPHOCYTES # BLD AUTO: 1.72 X10(3) UL (ref 1–4)
LYMPHOCYTES NFR BLD AUTO: 28 %
MCH RBC QN AUTO: 30.3 PG (ref 26–34)
MCHC RBC AUTO-ENTMCNC: 34.8 G/DL (ref 31–37)
MCV RBC AUTO: 87.2 FL
MONOCYTES # BLD AUTO: 0.42 X10(3) UL (ref 0.1–1)
MONOCYTES NFR BLD AUTO: 6.8 %
NEUTROPHILS # BLD AUTO: 3.71 X10 (3) UL (ref 1.5–7.7)
NEUTROPHILS # BLD AUTO: 3.71 X10(3) UL (ref 1.5–7.7)
NEUTROPHILS NFR BLD AUTO: 60.3 %
NONHDLC SERPL-MCNC: 82 MG/DL (ref ?–130)
OSMOLALITY SERPL CALC.SUM OF ELEC: 285 MOSM/KG (ref 275–295)
PLATELET # BLD AUTO: 221 10(3)UL (ref 150–450)
POTASSIUM SERPL-SCNC: 4.1 MMOL/L (ref 3.5–5.1)
PROT SERPL-MCNC: 7.7 G/DL (ref 6.4–8.2)
RBC # BLD AUTO: 4.52 X10(6)UL
SODIUM SERPL-SCNC: 138 MMOL/L (ref 136–145)
TRIGL SERPL-MCNC: 29 MG/DL (ref 30–149)
TSI SER-ACNC: 1.2 MIU/ML (ref 0.36–3.74)
VLDLC SERPL CALC-MCNC: 4 MG/DL (ref 0–30)
WBC # BLD AUTO: 6.2 X10(3) UL (ref 4–11)

## 2023-01-20 PROCEDURE — 36415 COLL VENOUS BLD VENIPUNCTURE: CPT

## 2023-01-20 PROCEDURE — 85025 COMPLETE CBC W/AUTO DIFF WBC: CPT

## 2023-01-20 PROCEDURE — 84443 ASSAY THYROID STIM HORMONE: CPT

## 2023-01-20 PROCEDURE — 80053 COMPREHEN METABOLIC PANEL: CPT

## 2023-01-20 PROCEDURE — 80061 LIPID PANEL: CPT

## 2023-02-13 ENCOUNTER — MOBILE ENCOUNTER (OUTPATIENT)
Dept: INTERNAL MEDICINE CLINIC | Facility: CLINIC | Age: 33
End: 2023-02-13

## 2023-02-13 RX ORDER — MONTELUKAST SODIUM 10 MG/1
10 TABLET ORAL DAILY
Qty: 90 TABLET | Refills: 0 | Status: SHIPPED | OUTPATIENT
Start: 2023-02-13 | End: 2024-02-08

## 2023-02-15 ENCOUNTER — OFFICE VISIT (OUTPATIENT)
Dept: NEUROLOGY | Facility: CLINIC | Age: 33
End: 2023-02-15
Payer: COMMERCIAL

## 2023-02-15 VITALS
RESPIRATION RATE: 16 BRPM | HEART RATE: 72 BPM | WEIGHT: 115 LBS | DIASTOLIC BLOOD PRESSURE: 71 MMHG | BODY MASS INDEX: 19.63 KG/M2 | HEIGHT: 64 IN | SYSTOLIC BLOOD PRESSURE: 104 MMHG

## 2023-02-15 DIAGNOSIS — G43.109 MIGRAINE WITH AURA AND WITHOUT STATUS MIGRAINOSUS, NOT INTRACTABLE: Primary | ICD-10-CM

## 2023-02-15 DIAGNOSIS — I78.1 TELANGIECTASIA: ICD-10-CM

## 2023-02-15 DIAGNOSIS — R90.89 ABNORMAL FINDING ON MRI OF BRAIN: ICD-10-CM

## 2023-02-15 PROCEDURE — 3078F DIAST BP <80 MM HG: CPT | Performed by: PHYSICIAN ASSISTANT

## 2023-02-15 PROCEDURE — 3008F BODY MASS INDEX DOCD: CPT | Performed by: PHYSICIAN ASSISTANT

## 2023-02-15 PROCEDURE — 99213 OFFICE O/P EST LOW 20 MIN: CPT | Performed by: PHYSICIAN ASSISTANT

## 2023-02-15 PROCEDURE — 3074F SYST BP LT 130 MM HG: CPT | Performed by: PHYSICIAN ASSISTANT

## 2023-02-15 RX ORDER — DOCUSATE SODIUM 100 MG/1
100 CAPSULE, LIQUID FILLED ORAL DAILY PRN
COMMUNITY

## 2023-02-15 RX ORDER — FREMANEZUMAB-VFRM 225 MG/1.5ML
225 INJECTION SUBCUTANEOUS
Qty: 4.5 ML | Refills: 2 | Status: SHIPPED | OUTPATIENT
Start: 2023-02-15

## 2023-02-21 ENCOUNTER — TELEPHONE (OUTPATIENT)
Dept: NEUROLOGY | Facility: CLINIC | Age: 33
End: 2023-02-21

## 2023-02-21 NOTE — TELEPHONE ENCOUNTER
Patient calling, advised that her Ajovy for the year requires a PA. Wants to ensure we started PA but also would like to  a sample if possible. Please advise.

## 2023-02-21 NOTE — TELEPHONE ENCOUNTER
Called and notified patient that she can stop by the office to  sample. Working on prior authorization for continued [employer]. Patient voiced understanding and will stop by today.

## 2023-02-27 NOTE — TELEPHONE ENCOUNTER
PA approved through 06/16/2023. RN faxed to the pharmacy at [phone]. Fax confirmation received.

## 2023-03-06 RX ORDER — AMOXICILLIN AND CLAVULANATE POTASSIUM 875; 125 MG/1; MG/1
1 TABLET, FILM COATED ORAL 2 TIMES DAILY
Qty: 20 TABLET | Refills: 0 | Status: SHIPPED | OUTPATIENT
Start: 2023-03-06 | End: 2023-03-16

## 2023-03-08 ENCOUNTER — OFFICE VISIT (OUTPATIENT)
Dept: INTERNAL MEDICINE CLINIC | Facility: CLINIC | Age: 33
End: 2023-03-08
Payer: COMMERCIAL

## 2023-03-08 VITALS
DIASTOLIC BLOOD PRESSURE: 58 MMHG | TEMPERATURE: 98 F | SYSTOLIC BLOOD PRESSURE: 92 MMHG | HEART RATE: 94 BPM | HEIGHT: 64 IN | BODY MASS INDEX: 20.14 KG/M2 | WEIGHT: 118 LBS

## 2023-03-08 DIAGNOSIS — G43.109 MIGRAINE WITH AURA AND WITHOUT STATUS MIGRAINOSUS, NOT INTRACTABLE: ICD-10-CM

## 2023-03-08 DIAGNOSIS — J45.990 EXERCISE-INDUCED ASTHMA: ICD-10-CM

## 2023-03-08 DIAGNOSIS — J45.901 MILD ASTHMA WITH EXACERBATION, UNSPECIFIED WHETHER PERSISTENT: ICD-10-CM

## 2023-03-08 DIAGNOSIS — F98.8 ADD (ATTENTION DEFICIT DISORDER) WITHOUT HYPERACTIVITY: ICD-10-CM

## 2023-03-08 DIAGNOSIS — J06.9 UPPER RESPIRATORY TRACT INFECTION, UNSPECIFIED TYPE: ICD-10-CM

## 2023-03-08 DIAGNOSIS — Z00.00 ROUTINE GENERAL MEDICAL EXAMINATION AT A HEALTH CARE FACILITY: Primary | ICD-10-CM

## 2023-03-08 PROCEDURE — 99395 PREV VISIT EST AGE 18-39: CPT | Performed by: INTERNAL MEDICINE

## 2023-03-08 PROCEDURE — 3074F SYST BP LT 130 MM HG: CPT | Performed by: INTERNAL MEDICINE

## 2023-03-08 PROCEDURE — 3078F DIAST BP <80 MM HG: CPT | Performed by: INTERNAL MEDICINE

## 2023-03-08 PROCEDURE — 3008F BODY MASS INDEX DOCD: CPT | Performed by: INTERNAL MEDICINE

## 2023-03-08 RX ORDER — PREDNISONE 20 MG/1
40 TABLET ORAL DAILY
Qty: 10 TABLET | Refills: 0 | Status: SHIPPED
Start: 2023-03-08 | End: 2023-03-13

## 2023-03-12 DIAGNOSIS — G43.109 MIGRAINE WITH AURA AND WITHOUT STATUS MIGRAINOSUS, NOT INTRACTABLE: ICD-10-CM

## 2023-03-13 RX ORDER — FREMANEZUMAB-VFRM 225 MG/1.5ML
INJECTION SUBCUTANEOUS
Qty: 1.5 ML | Refills: 5 | Status: SHIPPED | OUTPATIENT
Start: 2023-03-13

## 2023-03-21 ENCOUNTER — MOBILE ENCOUNTER (OUTPATIENT)
Dept: INTERNAL MEDICINE CLINIC | Facility: CLINIC | Age: 33
End: 2023-03-21

## 2023-03-21 RX ORDER — METHYLPREDNISOLONE 4 MG/1
TABLET ORAL
Qty: 1 EACH | Refills: 0 | Status: SHIPPED | OUTPATIENT
Start: 2023-03-21

## 2023-03-21 RX ORDER — FLUTICASONE PROPIONATE 110 UG/1
1 AEROSOL, METERED RESPIRATORY (INHALATION) 2 TIMES DAILY
Qty: 1 EACH | Refills: 0 | Status: SHIPPED | OUTPATIENT
Start: 2023-03-21 | End: 2023-03-22

## 2023-03-23 RX ORDER — BUDESONIDE AND FORMOTEROL FUMARATE DIHYDRATE 80; 4.5 UG/1; UG/1
2 AEROSOL RESPIRATORY (INHALATION) 2 TIMES DAILY
Qty: 1 EACH | Refills: 1 | Status: SHIPPED | OUTPATIENT
Start: 2023-03-23

## 2023-04-13 RX ORDER — DEXTROAMPHETAMINE SACCHARATE, AMPHETAMINE ASPARTATE MONOHYDRATE, DEXTROAMPHETAMINE SULFATE AND AMPHETAMINE SULFATE 2.5; 2.5; 2.5; 2.5 MG/1; MG/1; MG/1; MG/1
10 CAPSULE, EXTENDED RELEASE ORAL DAILY
Qty: 30 CAPSULE | Refills: 0 | Status: SHIPPED | OUTPATIENT
Start: 2023-04-13 | End: 2023-05-13

## 2023-05-08 ENCOUNTER — TELEPHONE (OUTPATIENT)
Dept: NEUROLOGY | Facility: CLINIC | Age: 33
End: 2023-05-08

## 2023-05-15 RX ORDER — MONTELUKAST SODIUM 10 MG/1
TABLET ORAL
Qty: 90 TABLET | Refills: 0 | Status: SHIPPED | OUTPATIENT
Start: 2023-05-15

## 2023-05-16 ENCOUNTER — PATIENT MESSAGE (OUTPATIENT)
Dept: INTERNAL MEDICINE CLINIC | Facility: CLINIC | Age: 33
End: 2023-05-16

## 2023-05-16 RX ORDER — DEXTROAMPHETAMINE SACCHARATE, AMPHETAMINE ASPARTATE MONOHYDRATE, DEXTROAMPHETAMINE SULFATE AND AMPHETAMINE SULFATE 2.5; 2.5; 2.5; 2.5 MG/1; MG/1; MG/1; MG/1
10 CAPSULE, EXTENDED RELEASE ORAL DAILY
Qty: 30 CAPSULE | Refills: 0 | Status: SHIPPED | OUTPATIENT
Start: 2023-05-16 | End: 2023-06-15

## 2023-06-20 ENCOUNTER — TELEPHONE (OUTPATIENT)
Dept: NEUROLOGY | Facility: CLINIC | Age: 33
End: 2023-06-20

## 2023-06-20 RX ORDER — FREMANEZUMAB-VFRM 225 MG/1.5ML
225 INJECTION SUBCUTANEOUS
Qty: 1 EACH | Refills: 0 | COMMUNITY
Start: 2023-06-20

## 2023-06-20 NOTE — TELEPHONE ENCOUNTER
Pt given sample of Ajovy  Per pt, pharmacy is not notifying when Rx is filled/in stock.     Spoke w/ pharmacy, noted no issues filling Rx, passing through insurance and have instock    Pt notified via [address]

## 2023-06-20 NOTE — TELEPHONE ENCOUNTER
Pt stated she is having issues getting ajovy refilled at the pharmacy and asking to get samples. Pt is currently in the parking lot. MA assisting pt.

## 2023-07-25 ENCOUNTER — PATIENT MESSAGE (OUTPATIENT)
Dept: INTERNAL MEDICINE CLINIC | Facility: CLINIC | Age: 33
End: 2023-07-25

## 2023-07-25 NOTE — TELEPHONE ENCOUNTER
From: Bk Nichols  To: Lavern Brown MD  Sent: 7/25/2023 7:42 AM CDT  Subject: Dexamethesone refill    Hello! Could I please have a refill of the dexamethasone mouth wash?

## 2023-07-26 RX ORDER — DEXAMETHASONE 0.5 MG/5ML
ELIXIR ORAL
Qty: 140 ML | Refills: 0 | Status: SHIPPED | OUTPATIENT
Start: 2023-07-26

## 2023-08-18 RX ORDER — MONTELUKAST SODIUM 10 MG/1
10 TABLET ORAL DAILY
Qty: 90 TABLET | Refills: 0 | Status: SHIPPED | OUTPATIENT
Start: 2023-08-18

## 2023-08-18 NOTE — TELEPHONE ENCOUNTER
Asthma & COPD Medication Protocol [id number]/2023 10:14 AM    Asthma Action Score greater than or equal to 20    AAP/ACT given in last 12 months    Appointment in past 6 or next 3 months          Last visit annual 3/8/23    Acute asthma exacerbation:  Historically mild, intermittent and exercise-induced, however in the light of now regular exposure to pet dander, symptoms may have progressed. Prednisone 40 mg once daily x5 days/doses ordered. If symptoms do not return to baseline, daily maintenance therapy may be pursued. Continue Singulair therapy. Hold x-ray evaluation at this time. Currently with an acute upper respiratory infection, however I suspect the pet dander exposure is the cause for her exacerbation.

## 2023-08-23 ENCOUNTER — PATIENT MESSAGE (OUTPATIENT)
Dept: INTERNAL MEDICINE CLINIC | Facility: CLINIC | Age: 33
End: 2023-08-23

## 2023-08-23 RX ORDER — DEXTROAMPHETAMINE SACCHARATE, AMPHETAMINE ASPARTATE MONOHYDRATE, DEXTROAMPHETAMINE SULFATE AND AMPHETAMINE SULFATE 2.5; 2.5; 2.5; 2.5 MG/1; MG/1; MG/1; MG/1
10 CAPSULE, EXTENDED RELEASE ORAL DAILY
Qty: 30 CAPSULE | Refills: 0 | Status: SHIPPED | OUTPATIENT
Start: 2023-08-23 | End: 2023-09-22

## 2023-08-23 NOTE — TELEPHONE ENCOUNTER
From: Elizabeth Stephen  To: Colonel Edson MD  Sent: 8/23/2023 9:42 AM CDT  Subject: Adderall refill    Morning! Could I please have a refill of my adderall?

## 2023-09-06 NOTE — ADDENDUM NOTE
Addended by: Cristopher Sidhu on: 12/13/2019 01:36 PM     Modules accepted: Orders Left otitis media - most likely due to recent URI but may also be other processes that cause one sided serous fluid. Treatment options including observation, nasal steroids, oral steroids, oral antihistamines, Astelin nasal spray, decongestants, vs in office pe tube. Most likely will resolve on its own within 3 months. Fluticasone nasal spray one spray each nostril twice per day  Astelin nasal spray one spray each nostril twice per day  Medrol dose pack for 6 days, if unable to tolerate may stop.   Once complete Medrol dose pack (steroids) then start Claritin D or Allegra D one tablet daily for 4 to 6 weeks

## 2023-12-04 ENCOUNTER — HOSPITAL ENCOUNTER (EMERGENCY)
Facility: HOSPITAL | Age: 33
Discharge: HOME | End: 2023-12-04
Attending: EMERGENCY MEDICINE
Payer: COMMERCIAL

## 2023-12-04 VITALS
HEART RATE: 63 BPM | HEIGHT: 64 IN | BODY MASS INDEX: 20.14 KG/M2 | SYSTOLIC BLOOD PRESSURE: 106 MMHG | OXYGEN SATURATION: 98 % | TEMPERATURE: 98 F | WEIGHT: 118 LBS | DIASTOLIC BLOOD PRESSURE: 70 MMHG | RESPIRATION RATE: 17 BRPM

## 2023-12-04 DIAGNOSIS — T78.2XXA ANAPHYLAXIS, INITIAL ENCOUNTER: Primary | ICD-10-CM

## 2023-12-04 PROCEDURE — 99291 CRITICAL CARE FIRST HOUR: CPT

## 2023-12-04 PROCEDURE — 96375 TX/PRO/DX INJ NEW DRUG ADDON: CPT

## 2023-12-04 PROCEDURE — 96361 HYDRATE IV INFUSION ADD-ON: CPT

## 2023-12-04 PROCEDURE — S0028 INJECTION, FAMOTIDINE, 20 MG: HCPCS | Performed by: EMERGENCY MEDICINE

## 2023-12-04 PROCEDURE — 96372 THER/PROPH/DIAG INJ SC/IM: CPT

## 2023-12-04 PROCEDURE — 96374 THER/PROPH/DIAG INJ IV PUSH: CPT

## 2023-12-04 PROCEDURE — 99284 EMERGENCY DEPT VISIT MOD MDM: CPT

## 2023-12-04 RX ORDER — FAMOTIDINE 10 MG/ML
20 INJECTION, SOLUTION INTRAVENOUS ONCE
Status: COMPLETED | OUTPATIENT
Start: 2023-12-04 | End: 2023-12-04

## 2023-12-04 RX ORDER — METHYLPREDNISOLONE SODIUM SUCCINATE 125 MG/2ML
INJECTION, POWDER, LYOPHILIZED, FOR SOLUTION INTRAMUSCULAR; INTRAVENOUS
Status: COMPLETED
Start: 2023-12-04 | End: 2023-12-04

## 2023-12-04 RX ORDER — METHYLPREDNISOLONE SODIUM SUCCINATE 125 MG/2ML
125 INJECTION, POWDER, LYOPHILIZED, FOR SOLUTION INTRAMUSCULAR; INTRAVENOUS ONCE
Status: COMPLETED | OUTPATIENT
Start: 2023-12-04 | End: 2023-12-04

## 2023-12-04 RX ORDER — DIPHENHYDRAMINE HYDROCHLORIDE 50 MG/ML
INJECTION INTRAMUSCULAR; INTRAVENOUS
Status: DISCONTINUED
Start: 2023-12-04 | End: 2023-12-05

## 2023-12-04 RX ORDER — EPINEPHRINE 0.3 MG/.3ML
0.3 INJECTION SUBCUTANEOUS
Qty: 1 EACH | Refills: 0 | Status: SHIPPED | OUTPATIENT
Start: 2023-12-04 | End: 2024-01-03

## 2023-12-04 RX ORDER — DIPHENHYDRAMINE HYDROCHLORIDE 50 MG/ML
25 INJECTION INTRAMUSCULAR; INTRAVENOUS ONCE
Status: COMPLETED | OUTPATIENT
Start: 2023-12-04 | End: 2023-12-04

## 2023-12-04 RX ORDER — PREDNISONE 20 MG/1
40 TABLET ORAL DAILY
Qty: 10 TABLET | Refills: 0 | Status: SHIPPED | OUTPATIENT
Start: 2023-12-04 | End: 2023-12-09

## 2023-12-05 NOTE — ED INITIAL ASSESSMENT (HPI)
Pt states having an allergic reaction tonight, c/o scratchy throat and rash, states took 2 benadryl PTA. Speaking complete sentences.

## 2023-12-05 NOTE — DISCHARGE INSTRUCTIONS
Use over-the-counter Benadryl as directed. Use Pepcid 10 mg twice a day.   Return to ER if any change or worsening symptoms

## 2024-01-10 ENCOUNTER — PATIENT MESSAGE (OUTPATIENT)
Dept: NEUROLOGY | Facility: CLINIC | Age: 34
End: 2024-01-10

## 2024-01-10 DIAGNOSIS — G43.109 MIGRAINE WITH AURA AND WITHOUT STATUS MIGRAINOSUS, NOT INTRACTABLE: ICD-10-CM

## 2024-01-10 RX ORDER — FREMANEZUMAB-VFRM 225 MG/1.5ML
225 INJECTION SUBCUTANEOUS
Qty: 3 EACH | Refills: 0 | Status: SHIPPED | OUTPATIENT
Start: 2024-01-10

## 2024-01-10 NOTE — TELEPHONE ENCOUNTER
From: Jaclyn Campbell  To: Oneida Govea  Sent: 1/10/2024 7:18 AM CST  Subject: Ajovy refill    Gamaliello, if I could please have a refill of Ajovy. 3 month supply again please.     I do have new insurance if it needs a prior authorization I hope this helps:    Abner   ID: [id number]  Group: [id number]

## 2024-01-10 NOTE — TELEPHONE ENCOUNTER
Medication: AJOVY     Date of last refill: 3/13/2023  1/5)  [place]     Last office visit: 2/15/2024  Due back to clinic per last office note:  one year  Date next office visit scheduled:    Future Appointments   Date Time Provider Department Center   3/19/2024  9:20 AM Faraz Gann MD [facility]     Sent [employer] message for patient to schedule follow up appointment.     Last OV note recommendation:    Discussion plus Diagnostics & Treatment Orders:     Migraines well controlled. Continue the Ajovy for headache prevention. Patient given samples of Ubrelvy for abortive tx     MRI Brain/Telangietasia- stable on MRI Brain done 1/2022.     (X) Discussed potential side effects of any treatment relevant to above.  Includes explanation of tests as necessary.     Return in about 1 year (around 2/15/2024).        Patient understands that if needed, based on condition and or test results, follow up will be readjusted     Oneida Govea PA-C  [facility]  2/15/2023, Time completed 10:21 AM

## 2024-02-05 ENCOUNTER — IMMUNIZATION (OUTPATIENT)
Dept: INTERNAL MEDICINE CLINIC | Facility: CLINIC | Age: 34
End: 2024-02-05
Payer: COMMERCIAL

## 2024-02-05 DIAGNOSIS — Z23 NEED FOR VACCINATION: Primary | ICD-10-CM

## 2024-02-05 PROCEDURE — 90686 IIV4 VACC NO PRSV 0.5 ML IM: CPT | Performed by: INTERNAL MEDICINE

## 2024-02-05 PROCEDURE — 90471 IMMUNIZATION ADMIN: CPT | Performed by: INTERNAL MEDICINE

## 2024-03-13 ENCOUNTER — PATIENT MESSAGE (OUTPATIENT)
Dept: INTERNAL MEDICINE CLINIC | Facility: CLINIC | Age: 34
End: 2024-03-13

## 2024-03-13 DIAGNOSIS — R53.83 OTHER FATIGUE: ICD-10-CM

## 2024-03-13 DIAGNOSIS — R68.89 COLD FEELING: ICD-10-CM

## 2024-03-13 DIAGNOSIS — Z00.00 ENCOUNTER FOR ANNUAL PHYSICAL EXAM: Primary | ICD-10-CM

## 2024-03-13 DIAGNOSIS — Z13.228 SCREENING FOR METABOLIC DISORDER: ICD-10-CM

## 2024-03-13 DIAGNOSIS — E53.8 B12 DEFICIENCY: ICD-10-CM

## 2024-03-13 DIAGNOSIS — Z13.29 SCREENING FOR THYROID DISORDER: ICD-10-CM

## 2024-03-13 DIAGNOSIS — Z13.220 SCREENING FOR CHOLESTEROL LEVEL: ICD-10-CM

## 2024-03-13 DIAGNOSIS — E55.9 VITAMIN D DEFICIENCY: ICD-10-CM

## 2024-03-13 DIAGNOSIS — Z13.0 SCREENING FOR BLOOD DISEASE: ICD-10-CM

## 2024-03-13 NOTE — TELEPHONE ENCOUNTER
[facility] labs pended. Coldness and fatigue dx code added to TSH along with screening.     AD, any add'l labs?

## 2024-03-13 NOTE — TELEPHONE ENCOUNTER
From: Jaclyn Campbell  To: Faraz Gann  Sent: 3/13/2024 11:58 AM CDT  Subject: Lab order     Hello,  Could you please order labs so I can have them done before my physical? Was wondering if some extra labs could be ordered. I am always exhausted and I am always so cold. Vitamin labs? Iron?    Thank you.

## 2024-03-15 ENCOUNTER — LAB ENCOUNTER (OUTPATIENT)
Dept: LAB | Age: 34
End: 2024-03-15
Attending: INTERNAL MEDICINE
Payer: COMMERCIAL

## 2024-03-15 DIAGNOSIS — R68.89 COLD FEELING: ICD-10-CM

## 2024-03-15 DIAGNOSIS — R53.83 OTHER FATIGUE: ICD-10-CM

## 2024-03-15 DIAGNOSIS — Z13.0 SCREENING FOR BLOOD DISEASE: ICD-10-CM

## 2024-03-15 DIAGNOSIS — E55.9 VITAMIN D DEFICIENCY: ICD-10-CM

## 2024-03-15 DIAGNOSIS — Z13.228 SCREENING FOR METABOLIC DISORDER: ICD-10-CM

## 2024-03-15 DIAGNOSIS — Z00.00 ENCOUNTER FOR ANNUAL PHYSICAL EXAM: ICD-10-CM

## 2024-03-15 DIAGNOSIS — Z13.29 SCREENING FOR THYROID DISORDER: ICD-10-CM

## 2024-03-15 DIAGNOSIS — Z13.220 SCREENING FOR CHOLESTEROL LEVEL: ICD-10-CM

## 2024-03-15 DIAGNOSIS — E53.8 B12 DEFICIENCY: ICD-10-CM

## 2024-03-15 LAB
ALBUMIN SERPL-MCNC: 4 G/DL (ref 3.4–5)
ALBUMIN/GLOB SERPL: 1.1 {RATIO} (ref 1–2)
ALP LIVER SERPL-CCNC: 53 U/L
ALT SERPL-CCNC: 17 U/L
ANION GAP SERPL CALC-SCNC: 3 MMOL/L (ref 0–18)
AST SERPL-CCNC: 18 U/L (ref 15–37)
BASOPHILS # BLD AUTO: 0.05 X10(3) UL (ref 0–0.2)
BASOPHILS NFR BLD AUTO: 1.1 %
BILIRUB SERPL-MCNC: 0.8 MG/DL (ref 0.1–2)
BUN BLD-MCNC: 11 MG/DL (ref 9–23)
CALCIUM BLD-MCNC: 9.3 MG/DL (ref 8.5–10.1)
CHLORIDE SERPL-SCNC: 109 MMOL/L (ref 98–112)
CHOLEST SERPL-MCNC: 186 MG/DL (ref ?–200)
CO2 SERPL-SCNC: 27 MMOL/L (ref 21–32)
CREAT BLD-MCNC: 0.87 MG/DL
EGFRCR SERPLBLD CKD-EPI 2021: 89 ML/MIN/1.73M2 (ref 60–?)
EOSINOPHIL # BLD AUTO: 0.13 X10(3) UL (ref 0–0.7)
EOSINOPHIL NFR BLD AUTO: 2.8 %
ERYTHROCYTE [DISTWIDTH] IN BLOOD BY AUTOMATED COUNT: 12.2 %
FASTING PATIENT LIPID ANSWER: YES
FASTING STATUS PATIENT QL REPORTED: YES
GLOBULIN PLAS-MCNC: 3.5 G/DL (ref 2.8–4.4)
GLUCOSE BLD-MCNC: 83 MG/DL (ref 70–99)
HCT VFR BLD AUTO: 39.3 %
HDLC SERPL-MCNC: 92 MG/DL (ref 40–59)
HGB BLD-MCNC: 13 G/DL
IMM GRANULOCYTES # BLD AUTO: 0.01 X10(3) UL (ref 0–1)
IMM GRANULOCYTES NFR BLD: 0.2 %
LDLC SERPL CALC-MCNC: 87 MG/DL (ref ?–100)
LYMPHOCYTES # BLD AUTO: 1.55 X10(3) UL (ref 1–4)
LYMPHOCYTES NFR BLD AUTO: 33.5 %
MCH RBC QN AUTO: 29.1 PG (ref 26–34)
MCHC RBC AUTO-ENTMCNC: 33.1 G/DL (ref 31–37)
MCV RBC AUTO: 88.1 FL
MONOCYTES # BLD AUTO: 0.3 X10(3) UL (ref 0.1–1)
MONOCYTES NFR BLD AUTO: 6.5 %
NEUTROPHILS # BLD AUTO: 2.59 X10 (3) UL (ref 1.5–7.7)
NEUTROPHILS # BLD AUTO: 2.59 X10(3) UL (ref 1.5–7.7)
NEUTROPHILS NFR BLD AUTO: 55.9 %
NONHDLC SERPL-MCNC: 94 MG/DL (ref ?–130)
OSMOLALITY SERPL CALC.SUM OF ELEC: 287 MOSM/KG (ref 275–295)
PLATELET # BLD AUTO: 268 10(3)UL (ref 150–450)
POTASSIUM SERPL-SCNC: 4.1 MMOL/L (ref 3.5–5.1)
PROT SERPL-MCNC: 7.5 G/DL (ref 6.4–8.2)
RBC # BLD AUTO: 4.46 X10(6)UL
SODIUM SERPL-SCNC: 139 MMOL/L (ref 136–145)
TRIGL SERPL-MCNC: 29 MG/DL (ref 30–149)
TSI SER-ACNC: 1.04 MIU/ML (ref 0.36–3.74)
VIT B12 SERPL-MCNC: 460 PG/ML (ref 193–986)
VIT D+METAB SERPL-MCNC: 38 NG/ML (ref 30–100)
VLDLC SERPL CALC-MCNC: 5 MG/DL (ref 0–30)
WBC # BLD AUTO: 4.6 X10(3) UL (ref 4–11)

## 2024-03-15 PROCEDURE — 84443 ASSAY THYROID STIM HORMONE: CPT

## 2024-03-15 PROCEDURE — 80061 LIPID PANEL: CPT

## 2024-03-15 PROCEDURE — 80053 COMPREHEN METABOLIC PANEL: CPT

## 2024-03-15 PROCEDURE — 36415 COLL VENOUS BLD VENIPUNCTURE: CPT

## 2024-03-15 PROCEDURE — 85025 COMPLETE CBC W/AUTO DIFF WBC: CPT

## 2024-03-15 PROCEDURE — 82306 VITAMIN D 25 HYDROXY: CPT

## 2024-03-15 PROCEDURE — 82607 VITAMIN B-12: CPT

## 2024-03-19 ENCOUNTER — OFFICE VISIT (OUTPATIENT)
Dept: INTERNAL MEDICINE CLINIC | Facility: CLINIC | Age: 34
End: 2024-03-19
Payer: COMMERCIAL

## 2024-03-19 VITALS
HEIGHT: 64.02 IN | OXYGEN SATURATION: 99 % | BODY MASS INDEX: 20.14 KG/M2 | WEIGHT: 118 LBS | RESPIRATION RATE: 16 BRPM | DIASTOLIC BLOOD PRESSURE: 68 MMHG | HEART RATE: 70 BPM | SYSTOLIC BLOOD PRESSURE: 98 MMHG | TEMPERATURE: 97 F

## 2024-03-19 DIAGNOSIS — F98.8 ADD (ATTENTION DEFICIT DISORDER) WITHOUT HYPERACTIVITY: ICD-10-CM

## 2024-03-19 DIAGNOSIS — G43.109 MIGRAINE WITH AURA AND WITHOUT STATUS MIGRAINOSUS, NOT INTRACTABLE: ICD-10-CM

## 2024-03-19 DIAGNOSIS — R68.89 COLD FEELING: ICD-10-CM

## 2024-03-19 DIAGNOSIS — Z87.892 HISTORY OF ANAPHYLAXIS: ICD-10-CM

## 2024-03-19 DIAGNOSIS — J45.990 EXERCISE-INDUCED ASTHMA (HCC): ICD-10-CM

## 2024-03-19 DIAGNOSIS — Z00.00 ROUTINE GENERAL MEDICAL EXAMINATION AT A HEALTH CARE FACILITY: Primary | ICD-10-CM

## 2024-03-19 PROBLEM — G43.811 OTHER MIGRAINE WITH STATUS MIGRAINOSUS, INTRACTABLE: Status: RESOLVED | Noted: 2022-01-12 | Resolved: 2024-03-19

## 2024-03-19 PROCEDURE — 99395 PREV VISIT EST AGE 18-39: CPT | Performed by: INTERNAL MEDICINE

## 2024-03-19 RX ORDER — LORATADINE 10 MG/1
TABLET ORAL
COMMUNITY

## 2024-03-19 RX ORDER — DEXTROAMPHETAMINE SACCHARATE, AMPHETAMINE ASPARTATE MONOHYDRATE, DEXTROAMPHETAMINE SULFATE AND AMPHETAMINE SULFATE 2.5; 2.5; 2.5; 2.5 MG/1; MG/1; MG/1; MG/1
CAPSULE, EXTENDED RELEASE ORAL
COMMUNITY
Start: 2023-11-09

## 2024-03-19 RX ORDER — AMLODIPINE BESYLATE 2.5 MG/1
2.5 TABLET ORAL DAILY PRN
Qty: 30 TABLET | Refills: 0 | Status: SHIPPED | OUTPATIENT
Start: 2024-03-19

## 2024-03-19 NOTE — PROGRESS NOTES
Jaclyn Campbell  6/7/1990    Chief Complaint   Patient presents with    Physical    Medication Request       HPI:   Jaclyn Campbell is a 33 year old female who presents for an annual physical examination.     The patient continues to enjoy an active lifestyle.  She tolerates her exercise regimen regularly without adverse symptoms.  She has remained very mindful of dietary habits.  She reports significant improvement in her symptoms of chronic migraine headaches with use of Ajovy per the neurology service.  Adderall extended release 10 mg daily has offered stable and favorable control of symptoms of ADHD.  This regimen is taken on an as-needed basis and is adequate for symptom control.  She continues to infrequently use inhaled Demetrice therapy as needed for breakthrough symptoms of asthma.    Laboratory findings, including CBC, CMP, TSH, lipid panel, vitamin B12 level, and vitamin D level are all favorable and within normal limits.    Current Outpatient Medications   Medication Sig Dispense Refill    amphetamine-dextroamphetamine ER 10 MG Oral Capsule SR 24 Hr       loratadine (CLARITIN) 10 MG Oral Tab       Fremanezumab-vfrm (AJOVY) 225 MG/1.5ML Subcutaneous Solution Auto-injector Inject 225 mg into the skin every 30 (thirty) days. 3 each 0    montelukast 10 MG Oral Tab Take 1 tablet (10 mg total) by mouth daily. 90 tablet 0    docusate sodium 100 MG Oral Cap Take 1 capsule (100 mg total) by mouth daily as needed for constipation.      albuterol (PROAIR HFA) 108 (90 Base) MCG/ACT Inhalation Aero Soln Inhale 2 puffs into the lungs every 4 (four) hours as needed for Wheezing or Shortness of Breath. 1 each 1    Dexamethasone 0.5 MG/5ML Oral Elixir RINSE AND SPIT 5 ML BY MOUTH TWICE DAILY FOR 14 DAYS (Patient not taking: Reported on 3/19/2024) 140 mL 0    Budesonide-Formoterol Fumarate (SYMBICORT) 80-4.5 MCG/ACT Inhalation Aerosol Inhale 2 puffs into the lungs 2 (two) times daily. (Patient not taking: Reported  on 3/19/2024) 1 each 1    methylPREDNISolone (MEDROL) 4 MG Oral Tablet Therapy Pack As directed. (Patient not taking: Reported on 3/19/2024) 1 each 0      Allergies   Allergen Reactions    Peanuts HIVES    Latex RASH    Seasonal OTHER (SEE COMMENTS)     Itchy eyes sneezing and cough    Tree Nuts ANAPHYLAXIS     Also peanuts, almonds. Cashews, legumes, walnuts--all nuts      Past Medical History:   Diagnosis Date    Allergic rhinitis due to pollen     Asthma     no flare ups for many years    Attention deficit disorder without mention of hyperactivity     Migraine with aura     Rh incompatibility     Trauma     Broken L arm       Patient Active Problem List   Diagnosis    Exercise-induced asthma (HCC)    Status migrainosus    ADD (attention deficit disorder) without hyperactivity    Family history of cystic fibrosis    Spontaneous vaginal delivery (HCC)    Hypokalemia    Other migraine with status migrainosus, intractable    Sinus bradycardia    Migraine with aura and without status migrainosus, not intractable      Past Surgical History:   Procedure Laterality Date    ANESTH,HUMERUS REPAIR Left 2002    pin in humerus (left)    OTHER      wisdom teeth      Family History   Problem Relation Age of Onset    Hypertension Father     Cancer Father         NHL, kidney, prostate    Allergies Sister     Schizophrenia Sister     High Cholesterol Mother     Gastro-Intestinal Disorder Mother         cholecystectomy    Thyroid Disorder Mother         small goiter    Hypertension Mother     Cancer Maternal Grandmother         Lung    Heart Disease Maternal Grandmother     Heart Surgery Maternal Grandmother         stents    Hypertension Maternal Grandfather     Eye Problems Maternal Grandfather         cataract    Heart Attack Paternal Grandfather     Mental Disorder Sister       Social History     Socioeconomic History    Marital status:      Spouse name: Sea    Number of children: 2    Years of education: 15    Occupational History    Occupation: [employer]     Employer: [employer]   Tobacco Use    Smoking status: Never    Smokeless tobacco: Never   Vaping Use    Vaping Use: Never used   Substance and Sexual Activity    Alcohol use: Yes     Alcohol/week: 2.0 standard drinks of alcohol     Types: 2 Standard drinks or equivalent per week    Drug use: No    Sexual activity: Yes     Partners: Male   Other Topics Concern     Service No    Blood Transfusions No    Caffeine Concern Yes     Comment: 3 x week    Occupational Exposure No    Hobby Hazards No    Sleep Concern No    Stress Concern No    Weight Concern No    Special Diet No    Back Care No    Exercise Yes     Comment: 3 x week    Bike Helmet No    Seat Belt Yes    Self-Exams Yes   Social History Narrative    Mother of two daughters.  She lives with her spouse.  Nonsmoker.  No alcohol use.  No illicits. 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         REVIEW OF SYSTEMS:   GENERAL: feels well otherwise  SKIN: no rashes  EYES:denies blurred vision or double vision  HEENT: not congested  LUNGS: denies shortness of breath with exertion  CARDIOVASCULAR: denies chest pain on exertion  GI: no nausea or abdominal pain  NEURO: denies headaches    EXAM:   BP 98/68   Pulse 70   Temp 97.2 °F (36.2 °C) (Temporal)   Resp 16   Ht 5' 4.02\" (1.626 m)   Wt 118 lb (53.5 kg)   LMP 03/08/2024   SpO2 99%   BMI 20.24 kg/m²   GENERAL: Well developed, well nourished,in no apparent distress  SKIN: No rashes,no suspicious lesions  EYES: bilateral conjunctiva are clear  HEENT: atraumatic, normocephalic. TM WNL BL.  NECK: supple,no adenopathy,no bruits  LUNGS: clear to auscultation  CARDIO: RRR without murmur  GI: good BS's,no masses, HSM or tenderness    ASSESSMENT AND PLAN:   Jaclyn Campbell is a 33 year old female who presents for an annual physical examination.    Outstanding screening and preventive measures:  Screening for cervical cancer: Advised follow-up  with gynecology service    Mild, intermittent asthma:  Stable and controlled  Continue infrequent inhaled Demetrice use    Intermittent cold sensation:  Most consistently exposure to cold temperature and intense emotional trigger suggestive of Raynaud's phenomenon  Trial of amlodipine at 2.5 mg daily ordered.  Her low normal blood pressure currently does not warrant 5 mg dosing.    Chronic migraine headache:  Continue Ajovy and trigger avoidance  Continue regular follow-up with neurology service    Tree nut allergy:  History of anaphylaxis  Continue trigger avoidance  Has access to EpiPen    ADHD:  Stable and controlled  Continue as needed Adderall use per psychiatry service    The patient indicates understanding of these issues and agrees to the plan.  TODAY'S ORDERS     No orders of the defined types were placed in this encounter.      Meds & Refills:  Requested Prescriptions      No prescriptions requested or ordered in this encounter       Imaging & Consults:  None    No follow-ups on file.  There are no Patient Instructions on file for this visit.    All questions were answered and the patient agrees with the plan.     Thank you,  Faraz Gann MD

## 2024-03-24 ENCOUNTER — OFFICE VISIT (OUTPATIENT)
Dept: FAMILY MEDICINE CLINIC | Facility: CLINIC | Age: 34
End: 2024-03-24
Payer: COMMERCIAL

## 2024-03-24 VITALS
DIASTOLIC BLOOD PRESSURE: 64 MMHG | OXYGEN SATURATION: 100 % | WEIGHT: 116 LBS | HEART RATE: 64 BPM | HEIGHT: 64 IN | RESPIRATION RATE: 16 BRPM | BODY MASS INDEX: 19.81 KG/M2 | SYSTOLIC BLOOD PRESSURE: 100 MMHG | TEMPERATURE: 98 F

## 2024-03-24 DIAGNOSIS — R39.9 UTI SYMPTOMS: Primary | ICD-10-CM

## 2024-03-24 LAB
APPEARANCE: CLEAR
BILIRUBIN: NEGATIVE
GLUCOSE (URINE DIPSTICK): NEGATIVE MG/DL
KETONES (URINE DIPSTICK): NEGATIVE MG/DL
LEUKOCYTES: NEGATIVE
MULTISTIX LOT#: NORMAL NUMERIC
NITRITE, URINE: NEGATIVE
OCCULT BLOOD: NEGATIVE
PH, URINE: 5.5 (ref 4.5–8)
PROTEIN (URINE DIPSTICK): NEGATIVE MG/DL
SPECIFIC GRAVITY: 1.01 (ref 1–1.03)
URINE-COLOR: YELLOW
UROBILINOGEN,SEMI-QN: 0.2 MG/DL (ref 0–1.9)

## 2024-03-24 PROCEDURE — 87086 URINE CULTURE/COLONY COUNT: CPT | Performed by: PHYSICIAN ASSISTANT

## 2024-03-24 PROCEDURE — 87186 SC STD MICRODIL/AGAR DIL: CPT | Performed by: PHYSICIAN ASSISTANT

## 2024-03-24 PROCEDURE — 81003 URINALYSIS AUTO W/O SCOPE: CPT | Performed by: PHYSICIAN ASSISTANT

## 2024-03-24 PROCEDURE — 99213 OFFICE O/P EST LOW 20 MIN: CPT | Performed by: PHYSICIAN ASSISTANT

## 2024-03-24 PROCEDURE — 87088 URINE BACTERIA CULTURE: CPT | Performed by: PHYSICIAN ASSISTANT

## 2024-03-24 RX ORDER — NITROFURANTOIN 25; 75 MG/1; MG/1
100 CAPSULE ORAL 2 TIMES DAILY
Qty: 14 CAPSULE | Refills: 0 | Status: SHIPPED | OUTPATIENT
Start: 2024-03-24 | End: 2024-03-31

## 2024-03-24 NOTE — PROGRESS NOTES
CHIEF COMPLAINT:     Chief Complaint   Patient presents with    UTI     3 days, frequency and urgency, discomfort, OTC azo      HPI:   Jaclyn Campbell is a 33 year old female who presents with symptoms of UTI. Complaining of urinary frequency, urgency, dysuria for last 3 days. Symptoms have been slightly improving since onset.  Treatments tried: water, cranberry juice and Azo.  Associated symptoms: none.   Denies flank pain, fever, hematuria, nausea, or vomiting.  Denies vaginal discharge or itching; no new sexual partners in the last 3 months.  No recent unprotected sexual intercourse.   Other  hx: last UTI several years  Hx of pyelonephritis:no  Hx of kidney stone: no    Current Outpatient Medications   Medication Sig Dispense Refill    nitrofurantoin monohydrate macro 100 MG Oral Cap Take 1 capsule (100 mg total) by mouth 2 (two) times daily for 7 days. 14 capsule 0    amphetamine-dextroamphetamine ER 10 MG Oral Capsule SR 24 Hr       loratadine (CLARITIN) 10 MG Oral Tab       amLODIPine 2.5 MG Oral Tab Take 1 tablet (2.5 mg total) by mouth daily as needed. 30 tablet 0    Fremanezumab-vfrm (AJOVY) 225 MG/1.5ML Subcutaneous Solution Auto-injector Inject 225 mg into the skin every 30 (thirty) days. 3 each 0    montelukast 10 MG Oral Tab Take 1 tablet (10 mg total) by mouth daily. 90 tablet 0    Dexamethasone 0.5 MG/5ML Oral Elixir RINSE AND SPIT 5 ML BY MOUTH TWICE DAILY FOR 14 DAYS (Patient not taking: Reported on 3/19/2024) 140 mL 0    Budesonide-Formoterol Fumarate (SYMBICORT) 80-4.5 MCG/ACT Inhalation Aerosol Inhale 2 puffs into the lungs 2 (two) times daily. (Patient not taking: Reported on 3/19/2024) 1 each 1    methylPREDNISolone (MEDROL) 4 MG Oral Tablet Therapy Pack As directed. (Patient not taking: Reported on 3/19/2024) 1 each 0    docusate sodium 100 MG Oral Cap Take 1 capsule (100 mg total) by mouth daily as needed for constipation.      albuterol (PROAIR HFA) 108 (90 Base) MCG/ACT Inhalation  Aero Soln Inhale 2 puffs into the lungs every 4 (four) hours as needed for Wheezing or Shortness of Breath. 1 each 1      Past Medical History:   Diagnosis Date    Allergic rhinitis due to pollen     Asthma     no flare ups for many years    Attention deficit disorder without mention of hyperactivity     Migraine with aura     Rh incompatibility     Trauma     Broken L arm       Social History:  Social History     Socioeconomic History    Marital status:      Spouse name: Sea    Number of children: 2    Years of education: 15   Occupational History    Occupation: [employer]     Employer: [employer]   Tobacco Use    Smoking status: Never    Smokeless tobacco: Never   Vaping Use    Vaping Use: Never used   Substance and Sexual Activity    Alcohol use: Yes     Alcohol/week: 2.0 standard drinks of alcohol     Types: 2 Standard drinks or equivalent per week    Drug use: No    Sexual activity: Yes     Partners: Male   Other Topics Concern     Service No    Blood Transfusions No    Caffeine Concern Yes     Comment: 3 x week    Occupational Exposure No    Hobby Hazards No    Sleep Concern No    Stress Concern No    Weight Concern No    Special Diet No    Back Care No    Exercise Yes     Comment: 3 x week    Bike Helmet No    Seat Belt Yes    Self-Exams Yes   Social History Narrative    Mother of two daughters.  She lives with her spouse.  Nonsmoker.  No alcohol use.  No illicits. 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         REVIEW OF SYSTEMS:   GENERAL: Denies fever, chills, or body aches; normal appetite  SKIN: no rashes, no skin wounds or ulcers.  CARDIOVASCULAR: denies chest pain or palpitations  LUNGS: denies shortness of breath, cough, or wheezing  GI: See HPI. No N/V/C/D.   : See HPI.  NEURO: no headaches.    EXAM:   /64   Pulse 64   Temp 97.7 °F (36.5 °C)   Resp 16   Ht 5' 4\" (1.626 m)   Wt 116 lb (52.6 kg)   LMP 03/08/2024   SpO2 100%   BMI 19.91 kg/m²   GENERAL: well  developed, well nourished,in no apparent distress  CARDIO: RRR, no murmurs  LUNGS: clear to ausculation bilaterally, no wheezing or rhonchi  GI: BS present x 4.  No hepatosplenomegaly, no tenderness in abd quadrants x 4  : no suprapubic tenderness; no bladder distention; no CVAT   EXTREMITIES: no edema    Recent Results (from the past 24 hour(s))   Urine Dip, auto without Micro    Collection Time: 03/24/24 12:55 PM   Result Value Ref Range    Glucose Urine Negative Negative mg/dL    Bilirubin Urine Negative Negative    Ketones, UA Negative Negative - Trace mg/dL    Spec Gravity 1.010 1.005 - 1.030    Blood Urine Negative Negative    PH Urine 5.5 5.0 - 8.0    Protein Urine Negative Negative - Trace mg/dL    Urobilinogen Urine 0.2 0.2 - 1.0 mg/dL    Nitrite Urine Negative Negative    Leukocyte Esterase Urine Negative Negative    APPEARANCE clear Clear    Color Urine yellow Yellow    Multistix Lot# [id number] Numeric    Multistix Expiration Date 8/31/24 Date         ASSESSMENT AND PLAN:   Jaclyn Campbell is a 33 year old female presents with UTI symptoms.    ASSESSMENT:  Encounter Diagnosis   Name Primary?    UTI symptoms Yes       PLAN: Urine dip results reviewed with pt. Pt traveling to [place] tomorrow, will start on empiric tx with Macrobid.  Meds as listed below. Risk and benefits of medication discussed.   Urine sent for culture. If negative advised to discontinue abx.   Comfort measures as described in Patient Instructions.   The patient is asked to f/u with PCP if no improvement in 3 days or sooner for new or worsening sx.  To higher level of care if there is fever, vomiting, worsening symptoms.  The patient indicates understanding of these issues and agrees to the plan.    Meds & Refills for this Visit:  Requested Prescriptions     Signed Prescriptions Disp Refills    nitrofurantoin monohydrate macro 100 MG Oral Cap 14 capsule 0     Sig: Take 1 capsule (100 mg total) by mouth 2 (two) times daily for 7  days.         There are no Patient Instructions on file for this visit.             63

## 2024-04-25 DIAGNOSIS — G43.109 MIGRAINE WITH AURA AND WITHOUT STATUS MIGRAINOSUS, NOT INTRACTABLE: ICD-10-CM

## 2024-04-26 RX ORDER — FREMANEZUMAB-VFRM 225 MG/1.5ML
INJECTION SUBCUTANEOUS
Qty: 4.5 ML | Refills: 1 | Status: SHIPPED | OUTPATIENT
Start: 2024-04-26

## 2024-04-26 NOTE — TELEPHONE ENCOUNTER
Medication: AJOVY 225 mg     Date of last refill: 01/10/2023 with 3 addt refills  [place]     Last office visit: 2/15/2024  Due back to clinic per last office note:  one year  Date next office visit scheduled:           Future Appointments   Date Time Provider Department Center   3/19/2024  9:20 AM Faraz Gann MD [facility]      Sent [employer] message for patient to schedule follow up appointment.     Last OV note recommendation:     Discussion plus Diagnostics & Treatment Orders:     Migraines well controlled. Continue the Ajovy for headache prevention. Patient given samples of Ubrelvy for abortive tx     MRI Brain/Telangietasia- stable on MRI Brain done 1/2022.     (X) Discussed potential side effects of any treatment relevant to above.  Includes explanation of tests as necessary.     Return in about 1 year (around 2/15/2024).

## 2024-05-06 ENCOUNTER — OFFICE VISIT (OUTPATIENT)
Dept: FAMILY MEDICINE CLINIC | Facility: CLINIC | Age: 34
End: 2024-05-06
Payer: COMMERCIAL

## 2024-05-06 VITALS
DIASTOLIC BLOOD PRESSURE: 62 MMHG | HEART RATE: 88 BPM | SYSTOLIC BLOOD PRESSURE: 102 MMHG | HEIGHT: 64 IN | WEIGHT: 120 LBS | BODY MASS INDEX: 20.49 KG/M2 | RESPIRATION RATE: 16 BRPM | OXYGEN SATURATION: 96 % | TEMPERATURE: 99 F

## 2024-05-06 DIAGNOSIS — J45.21 MILD INTERMITTENT ASTHMA WITH EXACERBATION (HCC): ICD-10-CM

## 2024-05-06 DIAGNOSIS — J01.00 ACUTE NON-RECURRENT MAXILLARY SINUSITIS: Primary | ICD-10-CM

## 2024-05-06 RX ORDER — PREDNISONE 20 MG/1
TABLET ORAL
Qty: 10 TABLET | Refills: 0 | Status: SHIPPED | OUTPATIENT
Start: 2024-05-06

## 2024-05-06 RX ORDER — AMOXICILLIN AND CLAVULANATE POTASSIUM 875; 125 MG/1; MG/1
1 TABLET, FILM COATED ORAL 2 TIMES DAILY
Qty: 20 TABLET | Refills: 0 | Status: SHIPPED | OUTPATIENT
Start: 2024-05-06 | End: 2024-05-16

## 2024-05-06 NOTE — PROGRESS NOTES
CHIEF COMPLAINT:     Chief Complaint   Patient presents with    Cough     Cough and sinus congestion x Monday facial pressure and pain having coughing fits        HPI:   Jaclyn Campbell is a 33 year old female who presents for sinus congestion for  8  days. Symptoms initially improved, then worsened quickly.  Sinus congestion/pain is described as a pressure and is located mainly in the maxillary sinuses.  Reports thick, discolored nasal discharge. Has treated symptoms with otc meds without relief. Has used inhaler for cough/asthma-- cough becomes more productive, but the relief is short-lived.  Patient also reports headache, cough, fullness in ears, sore throat.  Denies fever, dental pain, tinnitus, N/V/D.        Current Outpatient Medications   Medication Sig Dispense Refill    amoxicillin clavulanate 875-125 MG Oral Tab Take 1 tablet by mouth 2 (two) times daily for 10 days. 20 tablet 0    predniSONE 20 MG Oral Tab TAKE 2 TABS BY MOUTH DAILY FOR 5 DAYS 10 tablet 0    docusate sodium 100 MG Oral Cap Take 1 capsule (100 mg total) by mouth daily as needed for constipation.      albuterol (PROAIR HFA) 108 (90 Base) MCG/ACT Inhalation Aero Soln Inhale 2 puffs into the lungs every 4 (four) hours as needed for Wheezing or Shortness of Breath. 1 each 1    AJOVY 225 MG/1.5ML Subcutaneous Solution Auto-injector ADMINISTER 225MG( 1.5ML) INTO THE SKIN EVERY 30 DAYS (Patient not taking: Reported on 5/6/2024) 4.5 mL 1    amphetamine-dextroamphetamine ER 10 MG Oral Capsule SR 24 Hr  (Patient not taking: Reported on 5/6/2024)      loratadine (CLARITIN) 10 MG Oral Tab  (Patient not taking: Reported on 5/6/2024)      amLODIPine 2.5 MG Oral Tab Take 1 tablet (2.5 mg total) by mouth daily as needed. (Patient not taking: Reported on 5/6/2024) 30 tablet 0    montelukast 10 MG Oral Tab Take 1 tablet (10 mg total) by mouth daily. (Patient not taking: Reported on 5/6/2024) 90 tablet 0    Budesonide-Formoterol Fumarate (SYMBICORT)  80-4.5 MCG/ACT Inhalation Aerosol Inhale 2 puffs into the lungs 2 (two) times daily. (Patient not taking: Reported on 3/19/2024) 1 each 1      Past Medical History:    Allergic rhinitis due to pollen    Asthma    no flare ups for many years    Attention deficit disorder without mention of hyperactivity    Migraine with aura    Rh incompatibility    Trauma    Broken L arm       Past Surgical History:   Procedure Laterality Date    Anesth,humerus repair Left 2002    pin in humerus (left)    Other      wisdom teeth      Family History   Problem Relation Age of Onset    Hypertension Father     Cancer Father         NHL, kidney, prostate    Allergies Sister     Schizophrenia Sister     High Cholesterol Mother     Gastro-Intestinal Disorder Mother         cholecystectomy    Thyroid Disorder Mother         small goiter    Hypertension Mother     Cancer Maternal Grandmother         Lung    Heart Disease Maternal Grandmother     Heart Surgery Maternal Grandmother         stents    Hypertension Maternal Grandfather     Eye Problems Maternal Grandfather         cataract    Heart Attack Paternal Grandfather     Mental Disorder Sister       Social History     Socioeconomic History    Marital status:      Spouse name: Sea    Number of children: 2    Years of education: 15   Occupational History    Occupation: [employer]     Employer: [employer]   Tobacco Use    Smoking status: Never    Smokeless tobacco: Never   Vaping Use    Vaping status: Never Used   Substance and Sexual Activity    Alcohol use: Yes     Alcohol/week: 2.0 standard drinks of alcohol     Types: 2 Standard drinks or equivalent per week    Drug use: No    Sexual activity: Yes     Partners: Male   Other Topics Concern     Service No    Blood Transfusions No    Caffeine Concern Yes     Comment: 3 x week    Occupational Exposure No    Hobby Hazards No    Sleep Concern No    Stress Concern No    Weight Concern No    Special Diet No    Back Care No     Exercise Yes     Comment: 3 x week    Bike Helmet No    Seat Belt Yes    Self-Exams Yes   Social History Narrative    Mother of two daughters.  She lives with her spouse.  Nonsmoker.  No alcohol use.  No illicits. 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         REVIEW OF SYSTEMS:   GENERAL: feels well otherwise, no unplanned weight change,  normal appetite  SKIN: no rashes or abnormal skin lesions  HEENT: See HPI.    LUNGS: denies shortness of breath or wheezing, See HPI  CARDIOVASCULAR: denies chest pain or palpitations   GI: denies N/V/C or abdominal pain  NEURO: + sinus headaches.  No numbness or tingling in face.    EXAM:   /62   Pulse 88   Temp 98.7 °F (37.1 °C) (Oral)   Resp 16   Ht 5' 4\" (1.626 m)   Wt 120 lb (54.4 kg)   LMP 04/10/2024 (Approximate)   SpO2 96%   BMI 20.60 kg/m²   GENERAL: well developed, well nourished,in no apparent distress  SKIN: no rashes,no suspicious lesions  HEAD: atraumatic, normocephalic, + tenderness on palpation of maxillary sinuses  EYES: conjunctiva clear, EOM intact  EARS: TM's pearly, no bulging, no retraction, no fluid, bony landmarks present  NOSE: nostrils patent, no visible nasal mucous, nasal mucosa reddened and boggy  THROAT: oral mucosa pink, moist. No visible dental caries. Posterior pharynx is mildly erythematous. no exudates.  NECK: supple, non-tender  LUNGS: clear to auscultation bilaterally, no wheezes or rhonchi. Breathing is non labored.  CARDIO: RRR without murmur  EXTREMITIES: no cyanosis, clubbing or edema  LYMPH:  no lymphadenopathy.    NEURO:  No focal deficits      ASSESSMENT AND PLAN:     Encounter Diagnoses   Name Primary?    Acute non-recurrent maxillary sinusitis Yes    Mild intermittent asthma with exacerbation (HCC)        No orders of the defined types were placed in this encounter.      Meds & Refills for this Visit:  Requested Prescriptions     Signed Prescriptions Disp Refills    amoxicillin clavulanate 875-125 MG Oral  Tab 20 tablet 0     Sig: Take 1 tablet by mouth 2 (two) times daily for 10 days.    predniSONE 20 MG Oral Tab 10 tablet 0     Sig: TAKE 2 TABS BY MOUTH DAILY FOR 5 DAYS           Risks, benefits, side effects of medication addressed and explained.    Patient Instructions   Take antibiotics with food and plenty of water.   Eat yogurt or take probiotic daily. (Align is a good example of an OTC probiotic)  Make sure to finish the entire antibiotic treatment.  Take prednisone-- 2 tabs once daily for 5 days. Take with food.   While you are on steroids it is preferred that you take Tylenol for pain if needed rather than ibuprofen (Motrin/Ibuprofen) or Aleve.    Increase fluids and rest.     Use OTC meds for comfort as needed--  Ibuprofen/Tylenol for fever/pain  Use Benadryl at bedtime to reduce drainage and promote rest.  Zyrtec/Claritin/Allegra in the AM to reduce nasal drainage without sedation.   Use saline nasal sprays to reduce congestion and thin secretions.   Use Delsym for cough.   Consider applying shannon's vapo-rub or eucayptus oil to chest and feet at bedtime to reduce chest and nasal congestion.   Warm tea with honey, cough lozenges, vaporizers/steam etc.    Monitor symptoms and contact the office if no better in 2-3 days.      The patient indicates understanding of these issues and agrees to the plan.

## 2024-05-06 NOTE — PATIENT INSTRUCTIONS
Take antibiotics with food and plenty of water.   Eat yogurt or take probiotic daily. (Align is a good example of an OTC probiotic)  Make sure to finish the entire antibiotic treatment.  Take prednisone-- 2 tabs once daily for 5 days. Take with food.   While you are on steroids it is preferred that you take Tylenol for pain if needed rather than ibuprofen (Motrin/Ibuprofen) or Aleve.    Increase fluids and rest.     Use OTC meds for comfort as needed--  Ibuprofen/Tylenol for fever/pain  Use Benadryl at bedtime to reduce drainage and promote rest.  Zyrtec/Claritin/Allegra in the AM to reduce nasal drainage without sedation.   Use saline nasal sprays to reduce congestion and thin secretions.   Use Delsym for cough.   Consider applying shannon's vapo-rub or eucayptus oil to chest and feet at bedtime to reduce chest and nasal congestion.   Warm tea with honey, cough lozenges, vaporizers/steam etc.    Monitor symptoms and contact the office if no better in 2-3 days.

## 2024-09-03 ENCOUNTER — OFFICE VISIT (OUTPATIENT)
Dept: OBGYN CLINIC | Facility: CLINIC | Age: 34
End: 2024-09-03
Payer: COMMERCIAL

## 2024-09-03 VITALS
SYSTOLIC BLOOD PRESSURE: 115 MMHG | HEIGHT: 64 IN | WEIGHT: 124 LBS | BODY MASS INDEX: 21.17 KG/M2 | HEART RATE: 73 BPM | DIASTOLIC BLOOD PRESSURE: 79 MMHG

## 2024-09-03 DIAGNOSIS — Z12.4 SCREENING FOR CERVICAL CANCER: ICD-10-CM

## 2024-09-03 DIAGNOSIS — Z01.419 WELL WOMAN EXAM WITH ROUTINE GYNECOLOGICAL EXAM: Primary | ICD-10-CM

## 2024-09-03 DIAGNOSIS — N64.4 MASTALGIA: ICD-10-CM

## 2024-09-03 DIAGNOSIS — N93.9 ABNORMAL UTERINE BLEEDING: ICD-10-CM

## 2024-09-03 DIAGNOSIS — N91.2 AMENORRHEA: ICD-10-CM

## 2024-09-03 DIAGNOSIS — N92.0 MENORRHAGIA WITH REGULAR CYCLE: ICD-10-CM

## 2024-09-03 DIAGNOSIS — N92.6 LATE PERIOD: ICD-10-CM

## 2024-09-03 DIAGNOSIS — Z12.39 ENCOUNTER FOR BREAST CANCER SCREENING USING NON-MAMMOGRAM MODALITY: ICD-10-CM

## 2024-09-03 DIAGNOSIS — N83.209 CYST OF OVARY, UNSPECIFIED LATERALITY: ICD-10-CM

## 2024-09-03 LAB
CONTROL LINE PRESENT WITH A CLEAR BACKGROUND (YES/NO): YES YES/NO
KIT LOT #: NORMAL NUMERIC
PREGNANCY TEST, URINE: NEGATIVE

## 2024-09-03 PROCEDURE — 88175 CYTOPATH C/V AUTO FLUID REDO: CPT | Performed by: STUDENT IN AN ORGANIZED HEALTH CARE EDUCATION/TRAINING PROGRAM

## 2024-09-03 PROCEDURE — 99204 OFFICE O/P NEW MOD 45 MIN: CPT | Performed by: STUDENT IN AN ORGANIZED HEALTH CARE EDUCATION/TRAINING PROGRAM

## 2024-09-03 PROCEDURE — 87624 HPV HI-RISK TYP POOLED RSLT: CPT | Performed by: STUDENT IN AN ORGANIZED HEALTH CARE EDUCATION/TRAINING PROGRAM

## 2024-09-03 PROCEDURE — 81025 URINE PREGNANCY TEST: CPT | Performed by: STUDENT IN AN ORGANIZED HEALTH CARE EDUCATION/TRAINING PROGRAM

## 2024-09-03 PROCEDURE — 99385 PREV VISIT NEW AGE 18-39: CPT | Performed by: STUDENT IN AN ORGANIZED HEALTH CARE EDUCATION/TRAINING PROGRAM

## 2024-09-03 NOTE — H&P
[facility]  Obstetrics and Gynecology   History & Physical      Chief complaint:   Chief Complaint   Patient presents with    Physical     WWE       Subjective:     HPI: Jaclyn Campbell is a 34 year old  presenting for WWE.    Uses the following pronouns: she/her .    Her PCP is Faraz Gann MD.    Today, patient would like to discuss:    #left mastalgia   - 1 year   - does not feel a lump there, localized the lateral bottom aspect of her breast  - does notice it more on her period but notices also outside of her period   - unsure of what exacerbates or alleviates it    #weight gain  - 9 lbs weight gain over 6 months     Menstrual history:  - Current menses: regular every 28 days but is 1 week late now   - Number of tampons/pads on heaviest days:  much heavier now than they have ever been.  Needs multiple super pads during the day   - Dysmenorrhea: not beyond normal.  Never out for the count.    - Patient's last menstrual period was 2024 (exact date).    Pelvic pain:  - history of ovarian cysts that get very large and painful.  Feels like she has cystic pain now but not to the point in needing to go to the ED.      Family planning / Preconception  - How many children do you desire? 3 - satisfied parity     Sexual history:  - Currently sexually active? Yes   - Satisfied with current sexual activity: yes  - Preference: men   - STD history: no   - Interested in STD testing today?: no   - Pain on insertion? No   - Pain on deep penetration?: only when she has cysts - then it is painful to have sex and they stop   - Post coital bleeding: no      Other PCOS screen  - hirsutism, acne, male-pattern hair loss? No     Birth control history:  - Current BC: vasectomy, not on any hormones  - Past BC: NFP   - Interested in BC: no    Gynecologic Hygiene:  - Vulvar: Water, soap   - Douche?:  no   - Cotton underwear: yes     Cancer Screening:  Family history of ovarian/endometrial/cervical/breast cancer?  :  just breast cancer in both maternal great-grandmothers.    Cervical CA:   - last pap/HPV 2018 - nl   - due for one today? : yes  - History of abnl pap/HPV: 21 yo - colposcopy   - HPV vaccine status: yes   Breast cancer:   - any breast issues today?: see above   - last mammogram na  - due for mammogram today? : na  Colon cancer:   - family history no   - last colonoscopy: na    Other screening:    Lifestyle:   - Nutrition: incorporates whole food/plant based foods to their diet yes  - Exercise: 4  times per week   - Sleep: 7 hours   - Stress mgt / coping strategies: takes a shower and gets away from everyone   - People who support your health:      OB History  OB History    Para Term  AB Living   4 3 3 0 1 3   SAB IAB Ectopic Multiple Live Births   1 0 0 0 3     OB History    Para Term  AB Living   4 3 3 0 1 3   SAB IAB Ectopic Multiple Live Births   1 0 0 0 3      # Outcome Date GA Lbr Bong/2nd Weight Sex Type Anes PTL Lv   4 Term 16 39w2d 816:55 / 00:07 6 lb 10.4 oz (3.015 kg) M NORMAL SPONT EPI N GUSTAVO      Complications: Group B beta strep +   3 Term 14 39w0d 02:40 / 00:30 6 lb 13.4 oz (3.1 kg) F NORMAL SPONT EPI N GUSTAVO      Birth Comments: PASSED HEARING SCREEN   SCREEN SENT 2016   2 2016           1 Term /10 39w0d  6 lb 14 oz (3.118 kg) F NORMAL SPONT   GUSTAVO       ROS negative unless otherwise stated above    Meds:  Current Outpatient Medications on File Prior to Visit   Medication Sig Dispense Refill    predniSONE 20 MG Oral Tab TAKE 2 TABS BY MOUTH DAILY FOR 5 DAYS 10 tablet 0    AJOVY 225 MG/1.5ML Subcutaneous Solution Auto-injector ADMINISTER 225MG( 1.5ML) INTO THE SKIN EVERY 30 DAYS (Patient not taking: Reported on 2024) 4.5 mL 1    amphetamine-dextroamphetamine ER 10 MG Oral Capsule SR 24 Hr  (Patient not taking: Reported on 2024)      loratadine (CLARITIN) 10 MG Oral Tab  (Patient not taking: Reported on 2024)      amLODIPine  2.5 MG Oral Tab Take 1 tablet (2.5 mg total) by mouth daily as needed. (Patient not taking: Reported on 5/6/2024) 30 tablet 0    montelukast 10 MG Oral Tab Take 1 tablet (10 mg total) by mouth daily. (Patient not taking: Reported on 5/6/2024) 90 tablet 0    Budesonide-Formoterol Fumarate (SYMBICORT) 80-4.5 MCG/ACT Inhalation Aerosol Inhale 2 puffs into the lungs 2 (two) times daily. (Patient not taking: Reported on 3/19/2024) 1 each 1    docusate sodium 100 MG Oral Cap Take 1 capsule (100 mg total) by mouth daily as needed for constipation.      albuterol (PROAIR HFA) 108 (90 Base) MCG/ACT Inhalation Aero Soln Inhale 2 puffs into the lungs every 4 (four) hours as needed for Wheezing or Shortness of Breath. 1 each 1     No current facility-administered medications on file prior to visit.       PMH:  Past Medical History:    Allergic rhinitis due to pollen    Asthma    no flare ups for many years    Attention deficit disorder without mention of hyperactivity    Migraine with aura    Rh incompatibility    Trauma    Broken L arm        All:  Allergies   Allergen Reactions    Peanuts HIVES    Latex RASH    Seasonal OTHER (SEE COMMENTS)     Itchy eyes sneezing and cough    Tree Nuts ANAPHYLAXIS     Also peanuts, almonds. Cashews, legumes, walnuts--all nuts       PSH:  Past Surgical History:   Procedure Laterality Date    Anesth,humerus repair Left 2002    pin in humerus (left)    Other      wisdom teeth       Social History:  Social History     Socioeconomic History    Marital status:      Spouse name: Sea    Number of children: 2    Years of education: 15   Occupational History    Occupation: [employer]     Employer: [employer]   Tobacco Use    Smoking status: Never    Smokeless tobacco: Never   Vaping Use    Vaping status: Never Used   Substance and Sexual Activity    Alcohol use: Yes     Alcohol/week: 2.0 standard drinks of alcohol     Types: 2 Standard drinks or equivalent per week    Drug use: No    Sexual  activity: Yes     Partners: Male   Other Topics Concern     Service No    Blood Transfusions No    Caffeine Concern Yes     Comment: 3 x week    Occupational Exposure No    Hobby Hazards No    Sleep Concern No    Stress Concern No    Weight Concern No    Special Diet No    Back Care No    Exercise Yes     Comment: 3 x week    Bike Helmet No    Seat Belt Yes    Self-Exams Yes   Social History Narrative    Mother of two daughters.  She lives with her spouse.  Nonsmoker.  No alcohol use.  No illicits. 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        Family History:  Family History   Problem Relation Age of Onset    Hypertension Father     Cancer Father         NHL, kidney, prostate    High Cholesterol Mother     Gastro-Intestinal Disorder Mother         cholecystectomy    Thyroid Disorder Mother         small goiter    Hypertension Mother     Cancer Maternal Grandmother         Lung    Heart Disease Maternal Grandmother     Heart Surgery Maternal Grandmother         stents    Hypertension Maternal Grandfather     Eye Problems Maternal Grandfather         cataract    Heart Attack Paternal Grandfather     Allergies Sister     Schizophrenia Sister     Mental Disorder Sister     Cancer Sister     Cancer Paternal Great-Grandmother        Immunization History:  Immunization History   Administered Date(s) Administered    Covid-19 Vaccine Pfizer 30 mcg/0.3 ml 06/16/2021, 07/07/2021    DTP 07/21/1988, 07/21/1988, 10/18/1988, 10/18/1988, 12/13/1988, 06/09/1989, 12/13/1989, 07/06/1990, 03/18/1993, 03/18/1993    FLUZONE 6 months and older PFS 0.5 ml (65530) 02/05/2024    Flucelvax Influenza vaccine, trivalent (ccIIV3), 0.5mL IM 10/07/2016    Flulaval, 3 Years & >, IM 12/01/2022    HEP B 08/26/1997, 11/28/1997, 07/29/1998    HPV (Gardasil) 07/25/2007, 11/21/2007, 06/10/2009    Influenza 12/18/1989, 10/12/2009, 10/17/2012, 11/15/2014, 10/14/2015, 10/19/2017, 10/07/2019, 09/23/2021    Influenza Virus Vaccine, H1N1  11/09/2009    MMR 12/13/1989, 08/13/1993    Measles 04/08/1989    Meningococcal-Menactra 06/21/2006    OPV 07/21/1988, 10/18/1988, 07/06/1990, 03/18/1993    RHO(D) Immune Globulin 04/08/2016, 01/03/2018, 03/12/2018    TD 07/16/2002    TDAP 04/08/2016, 01/22/2018         Objective:     Vitals:    09/03/24 1013   BP: 115/79   Pulse: 73   Weight: 124 lb (56.2 kg)   Height: 64\"       Body mass index is 21.28 kg/m².    Physical Exam:     General: normal appearance  HEENT: normocephalic, no male pattern baldness, no acne  Breast: normal contour, 2 cm left lateral breast cyst that is mobile and smooth, no masses or lesions, no nipple discharge, chest hair not seen  Respiratory: normal work of breathing, no extra use of accessory muscles  Cardiac: normal rate  Abdominal: Nontender to palpation, no masses palpated  MSK: normal range of motion  Neuro: normal movement, normal sensory  Skin: no abnormalities seen    Pelvic:  Speculum Exam:  - normal appearing vulva, perineum, anus  - normal appearing urethral meatus, urethra  - Stage V ana maria pubic hair development  - normal appearing vagina, well estrogenized with ruggae, physiologic discharge  - multiparous cervix without masses   Bimanual exam:  - uterus is mobile and with normal descent.  No masses appreciated.  No uterine or adnexal tenderness.  No bladder pain  - Pelvic floor is non tender  Swabs: pap + HPV          Labs:  Lab Results   Component Value Date    WBC 4.6 03/15/2024    RBC 4.46 03/15/2024    HGB 13.0 03/15/2024    HCT 39.3 03/15/2024    MCV 88.1 03/15/2024    MCH 29.1 03/15/2024    MCHC 33.1 03/15/2024    RDW 12.2 03/15/2024    .0 03/15/2024    MPV 9.4 10/31/2011        Lab Results   Component Value Date    GLU 83 03/15/2024    BUN 11 03/15/2024    BUNCREA 22.4 (H) 12/22/2020    CREATSERUM 0.87 03/15/2024    ANIONGAP 3 03/15/2024     07/18/2017    GFRNAA 116 01/14/2022    GFRAA 134 01/14/2022    CA 9.3 03/15/2024    OSMOCALC 287 03/15/2024     ALKPHO 53 03/15/2024    AST 18 03/15/2024    ALT 17 03/15/2024    BILT 0.8 03/15/2024    TP 7.5 03/15/2024    ALB 4.0 03/15/2024    GLOBULIN 3.5 03/15/2024    AGRATIO 1.8 2013     03/15/2024    K 4.1 03/15/2024     03/15/2024    CO2 27.0 03/15/2024       Lab Results   Component Value Date    CHOLEST 186 03/15/2024    TRIG 29 (L) 03/15/2024    HDL 92 (H) 03/15/2024    LDL 87 03/15/2024    VLDL 5 03/15/2024    NONHDLC 94 03/15/2024        Lab Results   Component Value Date    T4F 1.0 2016    TSH 1.040 03/15/2024        No results found for: \"EAG\", \"A1C\"      Imaging:  No results found.     Assessment:     Jaclyn Campbell is a 34 year old  female presenting for WWE .    #WWE  [X] w dr cruz    #STD screening  Declines     #Cervical Cancer Screening  [ ] f/u Pap and HPV  [x] gardasil vaccine (available from 9 to 46 yo)    #Breast Cancer Screening  #Mastalagia - left  #left breast cyst  [X] Clinical breast exam  [ ] left breast US follow up   - Lifestyle management   - Vitamin E 800-1000 IUs per day   - Primrose Oil 1000-55878 mg per day   - Isoflavones (soy extract) 100 mg per day   - Low fat diet (<30%)   - Aerobic exercise 30-45 min, 3-5x per week   - No caffeine      #late period by 1 week   [X] UPT negative    #AUB - heavier  #history of ovarian cysts   Declines hormonal management  [ ] TVUS to rule out anatomical causes    #Lifestyle counseling based on recommendations from the American College of Lifestyle Medicine  1) Nutrition: extensive scientific evidence supports a diet that is predominantly whole-food and plant based as an important strategy in health optimization.  Such a diet is rich in fiber, antioxidants and is nutrient dense (ex. Minimally processed vegetables, fruits, whole grains, legumes, nuts and seeds)  2) Physical activity: at least 150 minutes of moderate exercise per week (anything that gets your heart beating faster).  You could divide this time into 30  minutes per day for 5 days.  2 of these days should be devoted to whole body muscle-strengthening/building activities (weight lifting, for example).  3) Sleep: 7 to 9 hours per night of restorative sleep.  You can use black out curtains, white noise machine and minimize screen time to do this.    4) Continue to avoid or limit risky substances like alcohol, nicotine, vaping, drugs, prescription opioids.  If you need help - through medication or counseling - to do this, please contact me so I can get you a referral or resources to support you.  5) Stress: Continue developing coping strategies to decrease stress.  6) Leverage the power of relationships and social networks to help reinforce health behaviors.  Studies show people are more successful at achieving health goals if the people they live with are supporting and even working towards those same health goals.    Return of care in 1 year or PRN     Ashley Worthington MD   [facility] - OBGYN    Note to patient and family:  The 21st Century Cures Act makes medical notes available to patients in the interest of transparency.  However, please be advised that this is a medical document.  It is intended as a peer to peer communication.  It is written in medical language and may contain abbreviations or verbiage that are technical and unfamiliar.  It may appear blunt or direct.  Medical documents are intended to carry relevant information, facts as evident, and the clinical opinion of the practitioner.

## 2024-09-04 LAB — HPV E6+E7 MRNA CVX QL NAA+PROBE: NEGATIVE

## 2024-09-07 LAB
.: NORMAL
.: NORMAL

## 2024-10-29 ENCOUNTER — PATIENT MESSAGE (OUTPATIENT)
Dept: INTERNAL MEDICINE CLINIC | Facility: CLINIC | Age: 34
End: 2024-10-29

## 2024-11-02 NOTE — TELEPHONE ENCOUNTER
Dr Gann- patient requesting medical exemption regarding flu vaccine- please see [employer]. Thanks.

## 2024-12-11 ENCOUNTER — TELEPHONE (OUTPATIENT)
Facility: CLINIC | Age: 34
End: 2024-12-11

## 2024-12-20 ENCOUNTER — OFFICE VISIT (OUTPATIENT)
Dept: FAMILY MEDICINE CLINIC | Facility: CLINIC | Age: 34
End: 2024-12-20
Payer: COMMERCIAL

## 2024-12-20 ENCOUNTER — TELEPHONE (OUTPATIENT)
Dept: INTERNAL MEDICINE CLINIC | Facility: CLINIC | Age: 34
End: 2024-12-20

## 2024-12-20 VITALS
BODY MASS INDEX: 20.49 KG/M2 | DIASTOLIC BLOOD PRESSURE: 76 MMHG | OXYGEN SATURATION: 98 % | WEIGHT: 120 LBS | TEMPERATURE: 98 F | SYSTOLIC BLOOD PRESSURE: 118 MMHG | RESPIRATION RATE: 16 BRPM | HEART RATE: 79 BPM | HEIGHT: 64 IN

## 2024-12-20 DIAGNOSIS — J06.9 VIRAL URI WITH COUGH: Primary | ICD-10-CM

## 2024-12-20 PROCEDURE — 99213 OFFICE O/P EST LOW 20 MIN: CPT | Performed by: PHYSICIAN ASSISTANT

## 2024-12-20 NOTE — TELEPHONE ENCOUNTER
Patient was just seen in [facility] and diagnosed with viral URI. She is calling for a second opinion. Thinks she has a sinus infection as she is blowing out green chunks and feels sinus pain and pressure. Offered patient appointment on Monday but she has to work. Advised patient to follow directions today from [facility], if worsening symptoms over the weekend, go to . Call next week with any questions or worsening symptoms. She is agreeable to plan.

## 2024-12-20 NOTE — PROGRESS NOTES
CHIEF COMPLAINT:     Chief Complaint   Patient presents with    Upper Respiratory Infection     X 3 days  Sx: cough, congestion       HPI:   Jaclyn Campbell is a 34 year old female who presents for cold symptoms for 3 days.  No fever. + body aches/chills. + headache. + nasal congestion. + sinus pressure. Mild b/l ear pain. No sore throat. Productive cough. Chest tightness. No chest pain or SOB. Hx of asthma, not using her inhaler. No GI symptoms. No covid at home testing. Taking Tylenol OTC       Current Outpatient Medications   Medication Sig Dispense Refill    predniSONE 20 MG Oral Tab TAKE 2 TABS BY MOUTH DAILY FOR 5 DAYS 10 tablet 0    AJOVY 225 MG/1.5ML Subcutaneous Solution Auto-injector ADMINISTER 225MG( 1.5ML) INTO THE SKIN EVERY 30 DAYS (Patient not taking: Reported on 5/6/2024) 4.5 mL 1    amphetamine-dextroamphetamine ER 10 MG Oral Capsule SR 24 Hr  (Patient not taking: Reported on 5/6/2024)      loratadine (CLARITIN) 10 MG Oral Tab  (Patient not taking: Reported on 5/6/2024)      amLODIPine 2.5 MG Oral Tab Take 1 tablet (2.5 mg total) by mouth daily as needed. (Patient not taking: Reported on 5/6/2024) 30 tablet 0    montelukast 10 MG Oral Tab Take 1 tablet (10 mg total) by mouth daily. (Patient not taking: Reported on 5/6/2024) 90 tablet 0    Budesonide-Formoterol Fumarate (SYMBICORT) 80-4.5 MCG/ACT Inhalation Aerosol Inhale 2 puffs into the lungs 2 (two) times daily. (Patient not taking: Reported on 3/19/2024) 1 each 1    docusate sodium 100 MG Oral Cap Take 1 capsule (100 mg total) by mouth daily as needed for constipation.      albuterol (PROAIR HFA) 108 (90 Base) MCG/ACT Inhalation Aero Soln Inhale 2 puffs into the lungs every 4 (four) hours as needed for Wheezing or Shortness of Breath. 1 each 1      Past Medical History:    Allergic rhinitis due to pollen    Asthma    no flare ups for many years    Attention deficit disorder without mention of hyperactivity    Migraine with aura    Rh  incompatibility    Trauma    Broken L arm       Past Surgical History:   Procedure Laterality Date    Anesth,humerus repair Left 2002    pin in humerus (left)    Other      wisdom teeth      Family History   Problem Relation Age of Onset    Hypertension Father     Cancer Father         NHL, kidney, prostate    High Cholesterol Mother     Gastro-Intestinal Disorder Mother         cholecystectomy    Thyroid Disorder Mother         small goiter    Hypertension Mother     Cancer Maternal Grandmother         Lung    Heart Disease Maternal Grandmother     Heart Surgery Maternal Grandmother         stents    Hypertension Maternal Grandfather     Eye Problems Maternal Grandfather         cataract    Heart Attack Paternal Grandfather     Allergies Sister     Schizophrenia Sister     Mental Disorder Sister     Cancer Sister     Cancer Paternal Great-Grandmother       Social History     Socioeconomic History    Marital status:      Spouse name: Sea    Number of children: 2    Years of education: 15   Occupational History    Occupation: [employer]     Employer: [employer]   Tobacco Use    Smoking status: Never    Smokeless tobacco: Never   Vaping Use    Vaping status: Never Used   Substance and Sexual Activity    Alcohol use: Yes     Alcohol/week: 2.0 standard drinks of alcohol     Types: 2 Standard drinks or equivalent per week    Drug use: No    Sexual activity: Yes     Partners: Male   Other Topics Concern     Service No    Blood Transfusions No    Caffeine Concern Yes     Comment: 3 x week    Occupational Exposure No    Hobby Hazards No    Sleep Concern No    Stress Concern No    Weight Concern No    Special Diet No    Back Care No    Exercise Yes     Comment: 3 x week    Bike Helmet No    Seat Belt Yes    Self-Exams Yes   Social History Narrative    Mother of two daughters.  She lives with her spouse.  Nonsmoker.  No alcohol use.  No illicits. 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          REVIEW OF SYSTEMS:   GENERAL:  denies  diminished appetite  SKIN: no rashes or abnormal skin lesions  HEENT: See HPI.    LUNGS: denies shortness of breath or wheezing, See HPI  CARDIOVASCULAR: denies chest pain or palpitations   GI: denies N/V/C or abdominal pain  NEURO: + sinus headaches.  No numbness or tingling in face.    EXAM:   /76   Pulse 79   Temp 97.9 °F (36.6 °C)   Resp 16   Ht 5' 4\" (1.626 m)   Wt 120 lb (54.4 kg)   LMP 11/25/2024 (Approximate)   SpO2 98%   BMI 20.60 kg/m²   Physical Exam  Constitutional:       General: She is not in acute distress.     Appearance: Normal appearance.   HENT:      Head: Normocephalic.      Right Ear: Tympanic membrane, ear canal and external ear normal.      Left Ear: Tympanic membrane, ear canal and external ear normal.      Nose: Rhinorrhea present.      Mouth/Throat:      Mouth: Mucous membranes are moist.      Pharynx: Oropharynx is clear. Uvula midline. Posterior oropharyngeal erythema (post nasal drip) present.      Tonsils: No tonsillar exudate.   Eyes:      Conjunctiva/sclera: Conjunctivae normal.      Pupils: Pupils are equal, round, and reactive to light.   Cardiovascular:      Rate and Rhythm: Normal rate and regular rhythm.      Heart sounds: Normal heart sounds. No murmur heard.  Pulmonary:      Effort: Pulmonary effort is normal. No respiratory distress.      Breath sounds: Normal breath sounds. No wheezing or rhonchi.   Chest:      Chest wall: No tenderness.   Musculoskeletal:      Cervical back: Normal range of motion and neck supple.   Lymphadenopathy:      Cervical: No cervical adenopathy.   Skin:     General: Skin is warm.      Findings: No rash.   Neurological:      Mental Status: She is alert and oriented to person, place, and time.          No results found for this or any previous visit (from the past 24 hours).    ASSESSMENT AND PLAN:   Jaclyn Campbell is a 34 year old female who presents with URI symptoms that are consistent  with:      ASSESSMENT:  Encounter Diagnosis   Name Primary?    Viral URI with cough Yes     Discussed viral testing for covid/flu/rsv but patient declined. Patient states she would not take Paxlovid and so does not feel covid testing is necessary     Discussed symptoms consistent with viral infection. Patient feels she needs an antibiotic, but no signs of bacterial infection on exam. Discussed differences between viral and bacterial infections.  Declined benzonatate       PLAN: Meds as below.  Comfort care instructions as listed in Patient Instructions    Patient Instructions   Rest   Push fluids   Tylenol or ibuprofen OTC as needed for pain  Zyrtec OTC once daily for nasal drainage  Saline nasal spray to thin nasal secretions    Flonase 1 spray each nostril twice daily for sinus pressure   Delsym OTC as needed for cough   Albuterol inhaler every 4-6 hours as needed   Please follow up with PCP if no improvement or if symptoms worsen      The patient indicates understanding of these issues and agrees to the plan.  The patient is asked to f/u with PCP if sx's persist or worsen.

## 2024-12-20 NOTE — PATIENT INSTRUCTIONS
Rest   Push fluids   Tylenol or ibuprofen OTC as needed for pain  Zyrtec OTC once daily for nasal drainage  Saline nasal spray to thin nasal secretions    Flonase 1 spray each nostril twice daily for sinus pressure   Delsym OTC as needed for cough   Albuterol inhaler every 4-6 hours as needed   Please follow up with PCP if no improvement or if symptoms worsen

## 2024-12-22 ENCOUNTER — HOSPITAL ENCOUNTER (OUTPATIENT)
Dept: GENERAL RADIOLOGY | Age: 34
Discharge: HOME | End: 2024-12-22
Attending: NURSE PRACTITIONER
Payer: COMMERCIAL

## 2024-12-22 ENCOUNTER — HOSPITAL ENCOUNTER (OUTPATIENT)
Dept: GENERAL RADIOLOGY | Age: 34
End: 2024-12-22
Attending: NURSE PRACTITIONER
Payer: COMMERCIAL

## 2024-12-22 ENCOUNTER — OFFICE VISIT (OUTPATIENT)
Dept: FAMILY MEDICINE CLINIC | Facility: CLINIC | Age: 34
End: 2024-12-22
Payer: COMMERCIAL

## 2024-12-22 VITALS
OXYGEN SATURATION: 97 % | TEMPERATURE: 99 F | SYSTOLIC BLOOD PRESSURE: 94 MMHG | WEIGHT: 120 LBS | BODY MASS INDEX: 20.49 KG/M2 | DIASTOLIC BLOOD PRESSURE: 47 MMHG | RESPIRATION RATE: 18 BRPM | HEART RATE: 78 BPM | HEIGHT: 64 IN

## 2024-12-22 DIAGNOSIS — J15.7 PNEUMONIA DUE TO MYCOPLASMA PNEUMONIAE, UNSPECIFIED LATERALITY, UNSPECIFIED PART OF LUNG: Primary | ICD-10-CM

## 2024-12-22 DIAGNOSIS — R05.9 COUGH, UNSPECIFIED TYPE: ICD-10-CM

## 2024-12-22 PROCEDURE — 99214 OFFICE O/P EST MOD 30 MIN: CPT | Performed by: NURSE PRACTITIONER

## 2024-12-22 PROCEDURE — 71046 X-RAY EXAM CHEST 2 VIEWS: CPT | Performed by: NURSE PRACTITIONER

## 2024-12-22 RX ORDER — AMOXICILLIN 500 MG/1
CAPSULE ORAL
Qty: 60 CAPSULE | Refills: 0 | Status: SHIPPED | OUTPATIENT
Start: 2024-12-22

## 2024-12-22 RX ORDER — AZITHROMYCIN 250 MG/1
TABLET, FILM COATED ORAL
Qty: 6 TABLET | Refills: 0 | Status: SHIPPED | OUTPATIENT
Start: 2024-12-22 | End: 2024-12-27

## 2024-12-23 NOTE — PROGRESS NOTES
CHIEF COMPLAINT:     Chief Complaint   Patient presents with    Cough     Cough, sinus pressure, and loss voice. Symptoms since Wednesday   OTC:  Advil and Albuterol            HPI:   Jaclyn Campbell is a 34 year old female who presents for cough for  4  days.  Cough started gradually and is described as tight and deep. Patient has history of asthma.  Cough is  productive and is worse at night. Other triggers for the cough: talking, activity, laying flat. Home treatments include: albuterol and advil       The patientis not a smoker.        Current Outpatient Medications   Medication Sig Dispense Refill    amoxicillin 500 MG Oral Cap Take 2 capsules (1000 mg) by mouth 3 times per day with food. 60 capsule 0    azithromycin 250 MG Oral Tab Take 2 tablets (500 mg total) by mouth daily for 1 day, THEN 1 tablet (250 mg total) daily for 4 days. 6 tablet 0    albuterol (PROAIR HFA) 108 (90 Base) MCG/ACT Inhalation Aero Soln Inhale 2 puffs into the lungs every 4 (four) hours as needed for Wheezing or Shortness of Breath. 1 each 1      Past Medical History:    Allergic rhinitis due to pollen    Asthma    no flare ups for many years    Attention deficit disorder without mention of hyperactivity    Migraine with aura    Rh incompatibility    Trauma    Broken L arm       Social History:  Social History     Socioeconomic History    Marital status:      Spouse name: Sea    Number of children: 2    Years of education: 15   Occupational History    Occupation: [employer]     Employer: [employer]   Tobacco Use    Smoking status: Never    Smokeless tobacco: Never   Vaping Use    Vaping status: Never Used   Substance and Sexual Activity    Alcohol use: Yes     Alcohol/week: 2.0 standard drinks of alcohol     Types: 2 Standard drinks or equivalent per week    Drug use: No    Sexual activity: Yes     Partners: Male   Other Topics Concern     Service No    Blood Transfusions No    Caffeine Concern Yes     Comment: 3 x  week    Occupational Exposure No    Hobby Hazards No    Sleep Concern No    Stress Concern No    Weight Concern No    Special Diet No    Back Care No    Exercise Yes     Comment: 3 x week    Bike Helmet No    Seat Belt Yes    Self-Exams Yes   Social History Narrative    Mother of two daughters.  She lives with her spouse.  Nonsmoker.  No alcohol use.  No illicits. 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        REVIEW OF SYSTEMS:   GENERAL: No fever or chills.  SKIN: No rashes, or other skin lesions.   EYES: Denies blurred vision or double vision.  HENT: Denies ear pain, decreased hearing, or sore throat.  Reports mild sinus congestion.  CARDIOVASCULAR: Denies chest pain or palpitations  LUNGS: Per HPI. Denies shortness of breath with exertion or rest.   GI: Denies N/V/C/D or abdominal pain.      EXAM:   BP 94/47 (BP Location: Left arm, Patient Position: Sitting, Cuff Size: adult)   Pulse 78   Temp 98.5 °F (36.9 °C) (Temporal)   Resp 18   Ht 5' 4\" (1.626 m)   Wt 120 lb (54.4 kg)   LMP 11/25/2024 (Approximate)   SpO2 97%   BMI 20.60 kg/m²   GENERAL: well developed, well nourished,in no apparent distress.  Not toxic appearing.  N  SKIN: no rashes, no suspicious lesions  EYES: Conjunctiva clear.  No scleral icterus.  HENT: Atraumatic, normocephalic.  TM's clear bilaterally.  Nostrils patent, nasal mucosa pink and non-inflamed.  + erythema of the throat.  NECK: supple, non-tender.  LUNGS: Normal respiratory rate. Normal effort.  Dry cough. + wheezing. No rales or crackles. + dullness on percussion of  posterior chest wall. + decreased BS: left lower lobe area.    CARDIO: RRR without murmur  LYMPH: No cervical or supraclavicular lymphadenopathy.   EXTREMITIES:  No clubbing, cyanosis, or edema.    ASSESSMENT AND PLAN:   Jaclyn Campbell is a 34 year old female who presents with: abnormal lung sounds, Chest x-ray ordered.     ASSESSMENT:  Encounter Diagnoses   Name Primary?    Cough, unspecified  type     Pneumonia due to Mycoplasma pneumoniae, unspecified laterality, unspecified part of lung Yes       PLAN:  + Chest x-ray, will treat with below medication, discussed continuing albuterol on a timed schedule.  Please remember that illnesses can change quickly, and although it is not felt that your symptoms currently require further treatment at the ER if you symptoms do worsen, change or if new symptoms were to develop please seek emergent care at the nearest ER.    To follow up with PCP in 2 weeks.    Meds & Refills for this Visit:  Requested Prescriptions     Signed Prescriptions Disp Refills    amoxicillin 500 MG Oral Cap 60 capsule 0     Sig: Take 2 capsules (1000 mg) by mouth 3 times per day with food.    azithromycin 250 MG Oral Tab 6 tablet 0     Sig: Take 2 tablets (500 mg total) by mouth daily for 1 day, THEN 1 tablet (250 mg total) daily for 4 days.           The patient indicates understanding of these issues and agrees to the plan.  The patient is asked to return if sx's persist or worsen.  Go to ER for significant shortness of breath, persistent fever.

## 2025-01-08 ENCOUNTER — OFFICE VISIT (OUTPATIENT)
Dept: INTERNAL MEDICINE CLINIC | Facility: CLINIC | Age: 35
End: 2025-01-08
Payer: COMMERCIAL

## 2025-01-08 ENCOUNTER — NURSE TRIAGE (OUTPATIENT)
Dept: INTERNAL MEDICINE CLINIC | Facility: CLINIC | Age: 35
End: 2025-01-08

## 2025-01-08 VITALS
TEMPERATURE: 97 F | DIASTOLIC BLOOD PRESSURE: 62 MMHG | HEIGHT: 64.57 IN | HEART RATE: 83 BPM | WEIGHT: 126.88 LBS | BODY MASS INDEX: 21.4 KG/M2 | SYSTOLIC BLOOD PRESSURE: 114 MMHG | RESPIRATION RATE: 16 BRPM | OXYGEN SATURATION: 98 %

## 2025-01-08 DIAGNOSIS — J45.21 MILD INTERMITTENT ASTHMA WITH ACUTE EXACERBATION (HCC): ICD-10-CM

## 2025-01-08 DIAGNOSIS — M54.2 NECK PAIN: Primary | ICD-10-CM

## 2025-01-08 DIAGNOSIS — R05.1 ACUTE COUGH: ICD-10-CM

## 2025-01-08 DIAGNOSIS — M62.838 MUSCLE SPASM: ICD-10-CM

## 2025-01-08 DIAGNOSIS — J18.9 PNEUMONIA DUE TO INFECTIOUS ORGANISM, UNSPECIFIED LATERALITY, UNSPECIFIED PART OF LUNG: ICD-10-CM

## 2025-01-08 PROCEDURE — 99214 OFFICE O/P EST MOD 30 MIN: CPT | Performed by: NURSE PRACTITIONER

## 2025-01-08 RX ORDER — FLUTICASONE PROPIONATE AND SALMETEROL XINAFOATE 115; 21 UG/1; UG/1
1 AEROSOL, METERED RESPIRATORY (INHALATION) 2 TIMES DAILY
Qty: 1 EACH | Refills: 0 | Status: SHIPPED | OUTPATIENT
Start: 2025-01-08

## 2025-01-08 RX ORDER — METHYLPREDNISOLONE 4 MG/1
TABLET ORAL
Qty: 1 EACH | Refills: 0 | Status: SHIPPED | OUTPATIENT
Start: 2025-01-08

## 2025-01-08 RX ORDER — FLUTICASONE PROPIONATE AND SALMETEROL 250; 50 UG/1; UG/1
1 POWDER RESPIRATORY (INHALATION) 2 TIMES DAILY
Qty: 1 EACH | Refills: 0 | Status: SHIPPED | OUTPATIENT
Start: 2025-01-08 | End: 2025-01-08

## 2025-01-08 RX ORDER — CODEINE PHOSPHATE AND GUAIFENESIN 10; 100 MG/5ML; MG/5ML
10 SOLUTION ORAL NIGHTLY PRN
Qty: 240 ML | Refills: 0 | Status: SHIPPED | OUTPATIENT
Start: 2025-01-08

## 2025-01-08 RX ORDER — FLUTICASONE PROPIONATE AND SALMETEROL 250; 50 UG/1; UG/1
1 POWDER RESPIRATORY (INHALATION) 2 TIMES DAILY
Qty: 1 EACH | Refills: 0 | Status: SHIPPED | OUTPATIENT
Start: 2025-01-08

## 2025-01-08 RX ORDER — BUDESONIDE AND FORMOTEROL FUMARATE DIHYDRATE 160; 4.5 UG/1; UG/1
2 AEROSOL RESPIRATORY (INHALATION) 2 TIMES DAILY
Qty: 1 EACH | Refills: 0 | Status: SHIPPED | OUTPATIENT
Start: 2025-01-08

## 2025-01-08 NOTE — TELEPHONE ENCOUNTER
Action Requested: Summary for Provider     []  Critical Lab, Recommendations Needed  [] Need Additional Advice  []   FYI    []   Need Orders  [] Need Medications Sent to Pharmacy  []  Other     SUMMARY: Pt scheduled same day appt today and was transferred for triage for neck pain.    Reason for call: Neck Pain  Onset: 3 days    Pt driving her daughter to the orthodontist. C/o neck pain on left side x3 days. Rates pain 5/10. States it feels like there is a know on her neck at the base of her skull. Appt appropriately scheduled.    Reason for Disposition   Patient wants to be seen    Protocols used: Neck Pain or [id number]

## 2025-01-08 NOTE — PROGRESS NOTES
Jaclyn Campbell is a 34 year old female.    Chief Complaint   Patient presents with    Neck Pain     MB- RM 10 - Constant neck pain for 5 days. From neck to eyes. 7/10 for pain scale       HPI:   here for eval of constant neck pain for 5 days  states worst pain ever.  she thinks maybe she slept wrong but not improving.  Left neck with limited ROM.  Increased pain radiating upward left side of her head to her eye. Worse with certain movements.  No headache per say.     URI and pneumonia end of December.   (Will f/u CXR)  Hx asthma. Exacerbation with recent URI/pneumonia.   Score 10.  Proair PRN  not using much.   Lingering cough from pneumonia.  She does not usually have issues with her asthma unless exposure or illness.   She feels better but still with cough.  SOB with exertion.  Discussed treatment options for both.  She does not particularly care for steroids but discussed this will help improve her asthma as well as muscle spasm.  Cough worse at night.  Keeping her up.  Dicussed cheratussin ac for cough  do not see much benefit for her with flexeril except over sedation.      Patient Active Problem List   Diagnosis    Exercise-induced asthma (HCC)    ADD (attention deficit disorder) without hyperactivity    Family history of cystic fibrosis    Migraine with aura and without status migrainosus, not intractable     Current Outpatient Medications   Medication Sig Dispense Refill    guaiFENesin-codeine 100-10 MG/5ML Oral Solution Take 10 mL by mouth nightly as needed for cough. 240 mL 0    fluticasone-salmeterol 115-21 MCG/ACT Inhalation Aerosol Inhale 1 puff into the lungs 2 (two) times daily. 1 each 0    methylPREDNISolone (MEDROL) 4 MG Oral Tablet Therapy Pack As directed. 1 each 0    amoxicillin 500 MG Oral Cap Take 2 capsules (1000 mg) by mouth 3 times per day with food. (Patient not taking: Reported on 1/8/2025) 60 capsule 0    albuterol (PROAIR HFA) 108 (90 Base) MCG/ACT Inhalation Aero Soln Inhale 2  puffs into the lungs every 4 (four) hours as needed for Wheezing or Shortness of Breath. 1 each 1      Past Medical History:    Allergic rhinitis due to pollen    Asthma    no flare ups for many years    Attention deficit disorder without mention of hyperactivity    Migraine with aura    Rh incompatibility    Trauma    Broken L arm       Social History:  Social History     Socioeconomic History    Marital status:      Spouse name: Sea    Number of children: 2    Years of education: 15   Occupational History    Occupation: [employer]     Employer: [employer]   Tobacco Use    Smoking status: Never    Smokeless tobacco: Never   Vaping Use    Vaping status: Never Used   Substance and Sexual Activity    Alcohol use: Yes     Alcohol/week: 2.0 standard drinks of alcohol     Types: 2 Standard drinks or equivalent per week    Drug use: No    Sexual activity: Yes     Partners: Male   Other Topics Concern     Service No    Blood Transfusions No    Caffeine Concern Yes     Comment: 3 x week    Occupational Exposure No    Hobby Hazards No    Sleep Concern No    Stress Concern No    Weight Concern No    Special Diet No    Back Care No    Exercise Yes     Comment: 3 x week    Bike Helmet No    Seat Belt Yes    Self-Exams Yes   Social History Narrative    Mother of two daughters.  She lives with her spouse.  Nonsmoker.  No alcohol use.  No illicits. 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     Family History   Problem Relation Age of Onset    Hypertension Father     Cancer Father         NHL, kidney, prostate    High Cholesterol Mother     Gastro-Intestinal Disorder Mother         cholecystectomy    Thyroid Disorder Mother         small goiter    Hypertension Mother     Cancer Maternal Grandmother         Lung    Heart Disease Maternal Grandmother     Heart Surgery Maternal Grandmother         stents    Hypertension Maternal Grandfather     Eye Problems Maternal Grandfather         cataract    Heart  Attack Paternal Grandfather     Allergies Sister     Schizophrenia Sister     Mental Disorder Sister     Cancer Sister     Cancer Paternal Great-Grandmother         Allergies  Allergies[1]    REVIEW OF SYSTEMS:   GENERAL HEALTH: feels well otherwise  RESPIRATORY: as above   CARDIOVASCULAR: denies chest pain on exertion, no palpatations  GI: denies abdominal pain and denies heartburn, no diarrhea or constipation  MUSCULOSKELETAL: as above    EXAM:   /62 (BP Location: Left arm, Patient Position: Sitting, Cuff Size: adult)   Pulse 83   Temp 97.4 °F (36.3 °C) (Temporal)   Resp 16   Ht 5' 4.57\" (1.64 m)   Wt 126 lb 14.4 oz (57.6 kg)   LMP 11/25/2024 (Approximate)   SpO2 98%   BMI 21.40 kg/m²   GENERAL: well developed, well nourished,in no apparent distress  HEENT: atraumatic, normocephalic,ears and throat are clear  NECK: supple,no adenopathy,  LUNGS: normal rate without respiratory distress, lungs clear to auscultation  decreased BS bilaterally.  No wheezing.    CARDIO: RRR   EXTREMITIES: no edema, normal strength and tone  MS  reproducible left upper trap neck pain with palpable spasm area.    PSYCH: alert and oriented x 3    ASSESSMENT AND PLAN:     Encounter Diagnoses   Name Primary?    Neck pain  muscular in nature.  Medrol dose pack followed by aleve.  Call with unresolved symptoms.   PT if not improving.   Yes    Pneumonia due to infectious organism, unspecified laterality, unspecified part of lung  CXR in follow up      Muscle spasm     Acute cough  cheratussin ac.  Discussed cough suppression.       Mild intermittent asthma with acute exacerbation (HCC)  steroid dose pack.  If intolerant will call.  Steroid inhaler sent but cost or coverage may be a problem.          No orders of the defined types were placed in this encounter.      Meds & Refills for this Visit:  Requested Prescriptions     Signed Prescriptions Disp Refills    guaiFENesin-codeine 100-10 MG/5ML Oral Solution 240 mL 0     Sig: Take  10 mL by mouth nightly as needed for cough.    fluticasone-salmeterol 115-21 MCG/ACT Inhalation Aerosol 1 each 0     Sig: Inhale 1 puff into the lungs 2 (two) times daily.    methylPREDNISolone (MEDROL) 4 MG Oral Tablet Therapy Pack 1 each 0     Sig: As directed.       Imaging & Consults:  XR CHEST PA + LAT CHEST (CPT=71046)    No follow-ups on file.  There are no Patient Instructions on file for this visit.         [1]   Allergies  Allergen Reactions    Peanuts HIVES    Latex RASH    Seasonal OTHER (SEE COMMENTS)     Itchy eyes sneezing and cough    Tree Nuts ANAPHYLAXIS     Also peanuts, almonds. Cashews, legumes, walnuts--all nuts

## 2025-02-12 ENCOUNTER — TELEPHONE (OUTPATIENT)
Facility: CLINIC | Age: 35
End: 2025-02-12

## 2025-02-12 DIAGNOSIS — N64.4 MASTALGIA: Primary | ICD-10-CM

## 2025-02-12 NOTE — TELEPHONE ENCOUNTER
Spoke with patient. She is aware mammography requested a bilateral diagnostic mammogram. Order placed. She is aware she can schedule. Understanding verbalized.

## 2025-05-12 ENCOUNTER — OFFICE VISIT (OUTPATIENT)
Dept: FAMILY MEDICINE CLINIC | Facility: CLINIC | Age: 35
End: 2025-05-12
Payer: COMMERCIAL

## 2025-05-12 VITALS
DIASTOLIC BLOOD PRESSURE: 62 MMHG | OXYGEN SATURATION: 97 % | WEIGHT: 126 LBS | HEART RATE: 64 BPM | TEMPERATURE: 98 F | BODY MASS INDEX: 21.51 KG/M2 | SYSTOLIC BLOOD PRESSURE: 92 MMHG | HEIGHT: 64 IN

## 2025-05-12 DIAGNOSIS — R30.0 DYSURIA: Primary | ICD-10-CM

## 2025-05-12 LAB
APPEARANCE: NORMAL
BILIRUBIN: NEGATIVE
GLUCOSE (URINE DIPSTICK): NEGATIVE MG/DL
KETONES (URINE DIPSTICK): NEGATIVE MG/DL
LEUKOCYTES: NEGATIVE
MULTISTIX LOT#: NORMAL NUMERIC
NITRITE, URINE: NEGATIVE
OCCULT BLOOD: NEGATIVE
PH, URINE: 6.5 (ref 4.5–8)
PROTEIN (URINE DIPSTICK): NEGATIVE MG/DL
SPECIFIC GRAVITY: 1.01 (ref 1–1.03)
URINE-COLOR: YELLOW
UROBILINOGEN,SEMI-QN: 0.2 MG/DL (ref 0–1.9)

## 2025-05-12 PROCEDURE — 87186 SC STD MICRODIL/AGAR DIL: CPT | Performed by: PHYSICIAN ASSISTANT

## 2025-05-12 PROCEDURE — 87086 URINE CULTURE/COLONY COUNT: CPT | Performed by: PHYSICIAN ASSISTANT

## 2025-05-12 PROCEDURE — 87077 CULTURE AEROBIC IDENTIFY: CPT | Performed by: PHYSICIAN ASSISTANT

## 2025-05-12 RX ORDER — NITROFURANTOIN 25; 75 MG/1; MG/1
100 CAPSULE ORAL 2 TIMES DAILY
Qty: 14 CAPSULE | Refills: 0 | Status: SHIPPED | OUTPATIENT
Start: 2025-05-12 | End: 2025-05-19

## 2025-05-12 NOTE — PROGRESS NOTES
CHIEF COMPLAINT:     Chief Complaint   Patient presents with    Urinary Symptoms     Entered by patient       HPI:   Jaclyn Campbell is a 34 year old female who presents with symptoms of UTI. The patient reports urinary frequency, urgency, and dysuria for last 2 days. Symptoms have been on and off since onset.  Associated symptoms include: Bladder pressure.   The patient denies abdominal pain, new back pain, fever, hematuria, nausea, or vomiting.  The patient denies vaginal lesions or discharge.  The patient denies new sexual partners in the last 3 months, or recent unprotected sexual intercourse. Patient denies history of kidney stones.  The patient reports she is overall improved since yesterday.  She reports yesterday were the worse symptoms she has ever had.  The patient reports a history of similar symptoms that sometimes resolve on their own.  The patient took OTC Azo last night.     Current Medications[1]   Past Medical History[2]   Social History:  Short Social Hx on File[3]      REVIEW OF SYSTEMS:   GENERAL: Denies fever, chills, or body aches  SKIN: no rashes, no skin wounds or ulcers.  GI: See HPI. No N/V/C/D.   : See HPI.  NEURO: no headaches.    EXAM:   BP 92/62   Pulse 64   Temp 98 °F (36.7 °C)   Ht 5' 4\" (1.626 m)   Wt 126 lb (57.2 kg)   LMP 04/24/2025 (Exact Date)   SpO2 97%   BMI 21.63 kg/m²   GENERAL: well developed, well nourished,in no apparent distress  CARDIO: RRR, no murmurs  LUNGS: clear to ausculation bilaterally, no wheezing or rhonchi  GI: BS present x 4.  No hepatosplenomegaly.  No tenderness.   BACK: No CVA tenderness    No results found for this or any previous visit (from the past 24 hours).      ASSESSMENT AND PLAN:   Jaclyn Campbell is a 34 year old female presents with UTI symptoms.    ASSESSMENT:  Encounter Diagnosis   Name Primary?    Dysuria Yes       PLAN: Meds as listed below.  Comfort measures as described in Patient Instructions    Meds & Refills for  this Visit:  Requested Prescriptions     Signed Prescriptions Disp Refills    nitrofurantoin monohydrate macro 100 MG Oral Cap 14 capsule 0     Sig: Take 1 capsule (100 mg total) by mouth 2 (two) times daily for 7 days.       Risk and benefits of medication discussed. Stressed importance of completing full course of antibiotic unless told otherwise.     Patient Instructions   Macrobid to hold  Urine culture sent  Fluids  Azo  Follow up with urology for possible IC       The patient indicates understanding of these issues and agrees to the plan.  The patient is asked to return in 3 days if not better. Call if fever, vomiting, worsening symptoms.           [1]   Current Outpatient Medications   Medication Sig Dispense Refill    nitrofurantoin monohydrate macro 100 MG Oral Cap Take 1 capsule (100 mg total) by mouth 2 (two) times daily for 7 days. 14 capsule 0    Budesonide-Formoterol Fumarate (SYMBICORT) 160-4.5 MCG/ACT Inhalation Aerosol Inhale 2 puffs into the lungs 2 (two) times daily. 1 each 0    cyclobenzaprine 10 MG Oral Tab Take 1 tablet (10 mg total) by mouth nightly as needed for Muscle spasms. (Patient not taking: Reported on 5/12/2025) 14 tablet 1    guaiFENesin-codeine 100-10 MG/5ML Oral Solution Take 10 mL by mouth nightly as needed for cough. (Patient not taking: Reported on 5/12/2025) 240 mL 0    fluticasone-salmeterol 115-21 MCG/ACT Inhalation Aerosol Inhale 1 puff into the lungs 2 (two) times daily. (Patient not taking: Reported on 5/12/2025) 1 each 0    methylPREDNISolone (MEDROL) 4 MG Oral Tablet Therapy Pack As directed. 1 each 0    fluticasone-salmeterol (WIXELA INHUB) 250-50 MCG/ACT Inhalation Aerosol Powder, Breath Activated Inhale 1 puff into the lungs 2 (two) times daily. 1 each 0    albuterol (PROAIR HFA) 108 (90 Base) MCG/ACT Inhalation Aero Soln Inhale 2 puffs into the lungs every 4 (four) hours as needed for Wheezing or Shortness of Breath. 1 each 1   [2]   Past Medical History:   Allergic  rhinitis due to pollen    Asthma    no flare ups for many years    Attention deficit disorder without mention of hyperactivity    Migraine with aura    Rh incompatibility    Trauma    Broken L arm    [3]   Social History  Socioeconomic History    Marital status:      Spouse name: Sea    Number of children: 2    Years of education: 15   Occupational History    Occupation: [employer]     Employer: [employer]   Tobacco Use    Smoking status: Never    Smokeless tobacco: Never   Vaping Use    Vaping status: Never Used   Substance and Sexual Activity    Alcohol use: Yes     Alcohol/week: 2.0 standard drinks of alcohol     Types: 2 Standard drinks or equivalent per week    Drug use: No    Sexual activity: Yes     Partners: Male   Other Topics Concern     Service No    Blood Transfusions No    Caffeine Concern Yes     Comment: 3 x week    Occupational Exposure No    Hobby Hazards No    Sleep Concern No    Stress Concern No    Weight Concern No    Special Diet No    Back Care No    Exercise Yes     Comment: 3 x week    Bike Helmet No    Seat Belt Yes    Self-Exams Yes   Social History Narrative    Mother of two daughters.  She lives with her spouse.  Nonsmoker.  No alcohol use.  No illicits. 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

## 2025-06-27 ENCOUNTER — OFFICE VISIT (OUTPATIENT)
Dept: SURGERY | Facility: CLINIC | Age: 35
End: 2025-06-27
Payer: COMMERCIAL

## 2025-06-27 VITALS
HEIGHT: 64 IN | DIASTOLIC BLOOD PRESSURE: 64 MMHG | SYSTOLIC BLOOD PRESSURE: 98 MMHG | BODY MASS INDEX: 21.11 KG/M2 | WEIGHT: 123.63 LBS

## 2025-06-27 DIAGNOSIS — N94.3 PMS (PREMENSTRUAL SYNDROME): ICD-10-CM

## 2025-06-27 DIAGNOSIS — N63.21 MASS OF UPPER OUTER QUADRANT OF LEFT BREAST: ICD-10-CM

## 2025-06-27 DIAGNOSIS — N92.4 EXCESSIVE BLEEDING IN PREMENOPAUSAL PERIOD: ICD-10-CM

## 2025-06-27 DIAGNOSIS — E34.9 HORMONE DISTURBANCE: Primary | ICD-10-CM

## 2025-06-27 PROBLEM — N63.20 MASS OF LEFT BREAST: Status: ACTIVE | Noted: 2025-06-27

## 2025-06-27 PROCEDURE — 99213 OFFICE O/P EST LOW 20 MIN: CPT | Performed by: OBSTETRICS & GYNECOLOGY

## 2025-06-27 NOTE — PROGRESS NOTES
NEW GYN H&P     2025  10:33 AM    Chief Complaint   Patient presents with    New Patient     Here to discuss hormonal changes and pms     Breast Lump     Pt c/o left breast pain/lump    .    HPI: Patient is a 35 year old  LMP 25 here to establish care and consult on concerns about PMS, hormonal changes, and ongoing left breast pain and lump since 2024. Reports past year with worsening severity of PMS symptoms, migraine headaches, and heavy menstrual flow with clots. Prefers non-hormonal treatment options rather than traditional medications. Discussed trial with Vitex chasteberry for PMS support and with addition of Ashwagandha if mood and stress still imbalanced. Will check serum hormones as well as pelvic USN for heavy menses, and ordered left breast USN to assess persistent left breast pain and lump. Questions answered and agrees with plan.     Patient's last menstrual period was 2025 (exact date).    OB History    Para Term  AB Living   4 3 3 0 1 3   SAB IAB Ectopic Multiple Live Births   1 0 0 0 3      # Outcome Date GA Lbr Bong/2nd Weight Sex Type Anes PTL Lv   4 Term 18 39w2d 816:55 / 00:07 6 lb 10.4 oz (3.015 kg) M NORMAL SPONT EPI N GUSTAVO      Complications: Group B beta strep +   3 Term 16 39w0d 02:40 / 00:30 6 lb 13.4 oz (3.1 kg) F NORMAL SPONT EPI N GUSTAVO      Birth Comments: PASSED HEARING SCREEN   SCREEN SENT 2016   2 SAB 2016           1 Term 03/18/10 39w0d  6 lb 14 oz (3.118 kg) F NORMAL SPONT   GUSTAVO       GYN hx:    Hx Prior Abnormal Pap: No  Pap Date: 24  Pap Result Notes: =wnl/-hpv  Follow Up Recommendation: Mammo: Never  CONTRACEPTION: Vasectomy  LAST MAMMOGRAM: None    Current Medications[1]    Past Medical History[2]  Past Surgical History[3]  Allergies[4]  Family History[5]  Set as collapsible by default.[6]  Social History     Social History Narrative    Mother of two daughters.  She lives with her spouse.  Nonsmoker.  No alcohol  use.  No illicits. 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       ROS:     Review of Systems:  A comprehensive 10 point ROS was completed. All pertinent positives and negatives noted in the HPI.     BP 98/64   Ht 64\"   Wt 123 lb 9.6 oz (56.1 kg)   LMP 2025 (Exact Date)   BMI 21.22 kg/m²     Exam:   GENERAL: well developed, well nourished, in no apparent distress  SKIN: no rashes, no lesions  HEENT: normal  LUNGS: respiration unlabored  CARDIOVASCULAR: no peripheral edema or varicosities, skin warm and dry  BREASTS: left breast with prominent fibroglandular tissue along upper outer quadrant of left breast with no skin or nipple changes or axillary adenopathy; normal right breast   ABDOMEN: Soft, non distended; non tender, no masses  EXTREMITIES:  nontender without edema      A/P: Patient is 35 year old female     1. Hormone disturbance  - Check ovarian labs    2. PMS (premenstrual syndrome)  - Start Vitex chasteberry \"PMS Support\" supplement  - Add \"Birdie\" Aswagandha 300 mg twice daily     3. Excessive bleeding in premenopausal period  - Pelvic USN ordered    4. Mass of upper outer quadrant of left breast  - Left breast USN ordered      Total time spent = 30 minutes  >50% visit = face to face counseling and coordination of care        2025  Jelly Maldonado MD                    [1]   No current outpatient medications on file.   [2]   Past Medical History:   Allergic rhinitis    Allergic rhinitis due to pollen    Anxiety    Asthma    no flare ups for many years    Attention deficit disorder without mention of hyperactivity    Migraine headache with aura    Migraine headache without aura    Migraine with aura    Ovarian cyst    H/X    Rh incompatibility    Trauma    Broken L arm    [3]   Past Surgical History:  Procedure Laterality Date    Anesth,humerus repair Left     pin in humerus (left) left arm      3/18/10 4/7/16 3/12/18    Other      wisdom teeth   [4]   Allergies  Allergen  Reactions    Peanuts HIVES    Latex RASH    Seasonal OTHER (SEE COMMENTS)     Itchy eyes sneezing and cough    Tree Nuts ANAPHYLAXIS     Also peanuts, almonds. Cashews, legumes, walnuts--all nuts   [5]   Family History  Problem Relation Age of Onset    Hypertension Father     Cancer Father         NHL, kidney, prostate    High Cholesterol Mother     Gastro-Intestinal Disorder Mother         cholecystectomy    Thyroid Disorder Mother         small goiter    Hypertension Mother     Pancreatic Cancer Maternal Grandmother     Cancer Maternal Grandmother         Lung    Heart Disease Maternal Grandmother     Heart Surgery Maternal Grandmother         stents    Hypertension Maternal Grandfather     Eye Problems Maternal Grandfather         cataract    Heart Attack Paternal Grandfather     Heart Disorder Paternal Grandfather         Heart attack    Allergies Sister     Schizophrenia Sister     Mental Disorder Sister     Cancer Sister         skin cancer    Cancer Paternal Great-Grandmother         lung cancer    Breast Cancer Neg     Ovarian Cancer Neg     Uterine Cancer Neg     Colon Cancer Neg    [6]   Social History  Socioeconomic History    Marital status:      Spouse name: Sea    Number of children: 2    Years of education: 15   Occupational History    Occupation: [employer]     Employer: [employer]   Tobacco Use    Smoking status: Never    Smokeless tobacco: Never   Vaping Use    Vaping status: Never Used   Substance and Sexual Activity    Alcohol use: Yes     Alcohol/week: 2.0 standard drinks of alcohol     Types: 2 Standard drinks or equivalent per week    Drug use: No    Sexual activity: Yes     Partners: Male

## 2025-07-07 ENCOUNTER — PATIENT MESSAGE (OUTPATIENT)
Dept: SURGERY | Facility: CLINIC | Age: 35
End: 2025-07-07

## 2025-08-05 ENCOUNTER — TELEPHONE (OUTPATIENT)
Dept: OBGYN CLINIC | Facility: CLINIC | Age: 35
End: 2025-08-05

## (undated) DIAGNOSIS — G43.109 MIGRAINE WITH AURA AND WITHOUT STATUS MIGRAINOSUS, NOT INTRACTABLE: Primary | ICD-10-CM

## (undated) NOTE — LETTER
Date: 11/1/2022    Patient Name: Elizabeth Stephen          To Whom it may concern: This letter has been written at the patient's request. The above patient was seen at the [facility] for treatment of a medical condition. This patient should be excused from attending work/school on 11/3/22. The patient may return to work/school on 11/7/22 as long as she is feeling better and remains fever free without fever reducing medication for at least 24 hours.          Sincerely,    Antonieta Iverson PA-C

## (undated) NOTE — MR AVS SNAPSHOT
[facility] [address], [address] [address] [phone]               Thank you for choosing us for your health care visit with [address] [place] [address], .   We are glad to serve you and happy to provide you with this s Your condition does not seem serious now; however, sometimes the signs of a serious problem may take more time to appear. For this reason, it is important for you to watch for any new symptoms, problems, or worsening of your condition.   Over the next few d include refined breads, white rice, fruit and vegetable juices without pulp, tender meats. These foods will pass more easily through the intestine.   · Avoid whole-grain foods, whole fruits and vegetables, meats, seeds and nuts, fried or fatty foods, dairy, Commonly known as:  BACTRIM DS                   Results of Recent Testing     URINE PREGNANCY TEST      Component Value Standard Range & Units    Pregnancy Test, Urine NEG     Control Line Present with a clear background (yes/no) YES Yes/No    Kit Lot # H

## (undated) NOTE — ED AVS SNAPSHOT
[facility] Emergency Department  [place]  [address] [address]  Phone:  [phone]  Fax:  [address] [facility]   MRN: [id number]    Department:  [facility] Emergency Department   Date of Visit:  7/18/201 IF THERE IS ANY CHANGE OR WORSENING OF YOUR CONDITION, CALL YOUR PRIMARY CARE PHYSICIAN AT ONCE OR RETURN IMMEDIATELY TO THE EMERGENCY DEPARTMENT.     If you have been prescribed any medication(s), please fill your prescription right away and begin taking t

## (undated) NOTE — LETTER
11/14/19  Date of Service: 11/14/2019    Dear Annalise Eason,     We had the pleasure of seeing Johana Kilpatrick in the Interventional Neuroradiology Clinic at [facility].  She is a 30year old female referred to our clinic by Ruthann Ahumada +speech and comprehension affected during severe headaches  The patient denies associated headaches, neck pain, nausea, or vomiting.   The patient denies cranial nerve symptoms such as blurred vision, diplopia, photophobia, ptosis, facial weakness/paresthes • Heart Attack Paternal Grandfather    • Mental Disorder Sister      *She denies any family history of stroke      Current Outpatient Medications:   •  SUMAtriptan Succinate (IMITREX) 100 MG Oral Tab, Take 1 tab po at onset of headache, may repeat in 2 erwin Right maxillary sinus mucous retention cyst.  Chronic left maxillary sinusitis. MRI Brain 2/19/2017  1.  There are trace areas of FLAIR hyperintensity along the cerebral sulci which could be artifactual , with subarachnoid hemorrhage or meningitis

## (undated) NOTE — MR AVS SNAPSHOT
[facility] [address], [address] [address] [phone]               Thank you for choosing us for your health care visit with [address] [place] [address], .   We are glad to serve you and happy to provide you with this s · Urethritis: This is an inflamed urethra, which is the tube that carries urine from the bladder to outside the body. You may have lower stomach or back pain. You may also have urgent or frequent urination. · Pyelonephritis: This is a kidney infection.  If antibiotic may be used. Be sure to discuss this option with your health care provider. · Follow up with your health care provider as directed. He or she may test to make sure the infection has cleared. If necessary, additional treatment may be started.   D [employer] Expiration Date 1-31-18 Date                  [employer]State [employer] Center[employer]     Sign up for [employer], your secure online medical record. [employer] will allow you to access patient instructions from your recent visit,  view other health information, and more.  To sign up

## (undated) NOTE — ED AVS SNAPSHOT
Prakash Ellis   MRN: [id number]    Department:  [facility] Emergency Department   Date of Visit:  8/25/2019           Disclosure     Insurance plans vary and the physician(s) referred by the ER may not be covered by your plan.  Please contact you tell this physician (or your personal doctor if your instructions are to return to your personal doctor) about any new or lasting problems. The primary care or specialist physician will see patients referred from the [facility] Emergency Department.  Tommy Weiner

## (undated) NOTE — ED AVS SNAPSHOT
Jerry Hassan   MRN: [id number]    Department:  [facility] Emergency Department   Date of Visit:  10/29/2019           Disclosure     Insurance plans vary and the physician(s) referred by the ER may not be covered by your plan.  Please contact yo tell this physician (or your personal doctor if your instructions are to return to your personal doctor) about any new or lasting problems. The primary care or specialist physician will see patients referred from the [facility] Emergency Department.  Tommy Weiner

## (undated) NOTE — LETTER
4/15/2022    Regarding: Thai Felton,  1990    In response to denial of Ajovy 225 mg    To Whom it May Concern:    Ms. Maxwell Silva is under my care for the management of chronic migraine headaches. For prevention of these headaches, I have recommended that Ms. Campbell use Ajovy 225 mg and am writing to appeal the denial of coverage for this product. Ms. Maxwell Silva has been using Ajovy with great success and therefore should be afforded the coverage to continue her therapeutic success. I note that the preferred CGRP products are Emgality and Aimovig. However, Ms. Maxwell Silva suffered from baseline constipation which would only be exacerbated with the use of Aimovig, thus rendering it unacceptable for her therapeutic use. Furthermore, Emgality simply is not an acceptable alternative for Ms. Campbell. I therefore request a reconsideration of the denial of coverage for Ajovy, as it is the most appropriate CGRP modulator to prevent her chronic migraine headaches. Please contact my office, should you have further questions. I look forward to your timely reply.       Sincerely,          Eagle Silverio MD  Vascular and General Neurology  [facility]